# Patient Record
Sex: FEMALE | Race: WHITE | NOT HISPANIC OR LATINO | Employment: FULL TIME | URBAN - METROPOLITAN AREA
[De-identification: names, ages, dates, MRNs, and addresses within clinical notes are randomized per-mention and may not be internally consistent; named-entity substitution may affect disease eponyms.]

---

## 2017-09-06 ENCOUNTER — OFFICE VISIT (OUTPATIENT)
Dept: ORTHOPEDIC SURGERY | Facility: CLINIC | Age: 57
End: 2017-09-06

## 2017-09-06 VITALS
DIASTOLIC BLOOD PRESSURE: 91 MMHG | HEART RATE: 108 BPM | BODY MASS INDEX: 43.98 KG/M2 | HEIGHT: 62 IN | WEIGHT: 239 LBS | SYSTOLIC BLOOD PRESSURE: 134 MMHG

## 2017-09-06 DIAGNOSIS — Z47.89 ORTHOPEDIC AFTERCARE: Primary | ICD-10-CM

## 2017-09-06 PROCEDURE — 99024 POSTOP FOLLOW-UP VISIT: CPT | Performed by: ORTHOPAEDIC SURGERY

## 2017-09-06 RX ORDER — CITALOPRAM 20 MG/1
TABLET ORAL
COMMUNITY
Start: 2017-06-07 | End: 2019-08-09 | Stop reason: SDUPTHER

## 2017-09-06 RX ORDER — ESTRADIOL 0.04 MG/D
PATCH TRANSDERMAL
COMMUNITY
Start: 2017-08-28 | End: 2018-10-03 | Stop reason: SDUPTHER

## 2017-10-09 ENCOUNTER — TELEPHONE (OUTPATIENT)
Dept: ORTHOPEDIC SURGERY | Facility: CLINIC | Age: 57
End: 2017-10-09

## 2017-10-11 ENCOUNTER — OFFICE VISIT (OUTPATIENT)
Dept: ORTHOPEDIC SURGERY | Facility: CLINIC | Age: 57
End: 2017-10-11

## 2017-10-11 VITALS — HEIGHT: 62 IN

## 2017-10-11 DIAGNOSIS — Z47.89 ORTHOPEDIC AFTERCARE: ICD-10-CM

## 2017-10-11 DIAGNOSIS — M17.12 PRIMARY OSTEOARTHRITIS OF LEFT KNEE: Primary | ICD-10-CM

## 2017-10-11 PROCEDURE — 20610 DRAIN/INJ JOINT/BURSA W/O US: CPT | Performed by: ORTHOPAEDIC SURGERY

## 2017-10-11 PROCEDURE — 99024 POSTOP FOLLOW-UP VISIT: CPT | Performed by: ORTHOPAEDIC SURGERY

## 2017-10-11 RX ORDER — HYDROXYZINE HYDROCHLORIDE 25 MG/1
TABLET, FILM COATED ORAL
COMMUNITY
Start: 2017-09-21 | End: 2019-01-30

## 2017-10-11 RX ORDER — METHOCARBAMOL 500 MG/1
TABLET, FILM COATED ORAL
COMMUNITY
Start: 2017-09-21 | End: 2019-01-30

## 2017-10-11 RX ORDER — TRIAMCINOLONE ACETONIDE 40 MG/ML
40 INJECTION, SUSPENSION INTRA-ARTICULAR; INTRAMUSCULAR
Status: COMPLETED | OUTPATIENT
Start: 2017-10-11 | End: 2017-10-11

## 2017-10-11 RX ORDER — LIDOCAINE HYDROCHLORIDE 10 MG/ML
4 INJECTION, SOLUTION INFILTRATION; PERINEURAL
Status: COMPLETED | OUTPATIENT
Start: 2017-10-11 | End: 2017-10-11

## 2017-10-11 RX ADMIN — LIDOCAINE HYDROCHLORIDE 4 ML: 10 INJECTION, SOLUTION INFILTRATION; PERINEURAL at 11:53

## 2017-10-11 RX ADMIN — TRIAMCINOLONE ACETONIDE 40 MG: 40 INJECTION, SUSPENSION INTRA-ARTICULAR; INTRAMUSCULAR at 11:53

## 2017-11-08 ENCOUNTER — OFFICE VISIT (OUTPATIENT)
Dept: ORTHOPEDIC SURGERY | Facility: CLINIC | Age: 57
End: 2017-11-08

## 2017-11-08 DIAGNOSIS — M17.12 PRIMARY OSTEOARTHRITIS OF LEFT KNEE: ICD-10-CM

## 2017-11-08 DIAGNOSIS — Z47.89 ORTHOPEDIC AFTERCARE: Primary | ICD-10-CM

## 2017-11-08 PROCEDURE — 99024 POSTOP FOLLOW-UP VISIT: CPT | Performed by: ORTHOPAEDIC SURGERY

## 2017-11-08 RX ORDER — PREDNISONE 20 MG/1
TABLET ORAL
COMMUNITY
Start: 2017-11-07 | End: 2019-01-30

## 2017-11-08 RX ORDER — MELOXICAM 15 MG/1
TABLET ORAL
COMMUNITY
Start: 2017-11-07 | End: 2019-01-30

## 2018-01-17 ENCOUNTER — OFFICE VISIT (OUTPATIENT)
Dept: ORTHOPEDIC SURGERY | Facility: CLINIC | Age: 58
End: 2018-01-17

## 2018-01-17 VITALS
DIASTOLIC BLOOD PRESSURE: 95 MMHG | HEART RATE: 96 BPM | WEIGHT: 235 LBS | BODY MASS INDEX: 43.24 KG/M2 | HEIGHT: 62 IN | SYSTOLIC BLOOD PRESSURE: 139 MMHG

## 2018-01-17 DIAGNOSIS — M17.12 PRIMARY OSTEOARTHRITIS OF LEFT KNEE: Primary | ICD-10-CM

## 2018-01-17 PROCEDURE — 99212 OFFICE O/P EST SF 10 MIN: CPT | Performed by: ORTHOPAEDIC SURGERY

## 2018-02-07 ENCOUNTER — LAB REQUISITION (OUTPATIENT)
Dept: LAB | Facility: HOSPITAL | Age: 58
End: 2018-02-07

## 2018-02-07 DIAGNOSIS — Z00.00 ROUTINE GENERAL MEDICAL EXAMINATION AT A HEALTH CARE FACILITY: ICD-10-CM

## 2018-02-07 PROCEDURE — 36415 COLL VENOUS BLD VENIPUNCTURE: CPT

## 2018-05-18 ENCOUNTER — LAB REQUISITION (OUTPATIENT)
Dept: LAB | Facility: HOSPITAL | Age: 58
End: 2018-05-18

## 2018-05-18 DIAGNOSIS — Z00.00 ROUTINE GENERAL MEDICAL EXAMINATION AT A HEALTH CARE FACILITY: ICD-10-CM

## 2018-05-18 PROCEDURE — 36415 COLL VENOUS BLD VENIPUNCTURE: CPT

## 2018-05-30 ENCOUNTER — OFFICE VISIT (OUTPATIENT)
Dept: ORTHOPEDIC SURGERY | Facility: CLINIC | Age: 58
End: 2018-05-30

## 2018-05-30 VITALS — BODY MASS INDEX: 38.63 KG/M2 | HEART RATE: 95 BPM | WEIGHT: 204.59 LBS | HEIGHT: 61 IN | OXYGEN SATURATION: 99 %

## 2018-05-30 DIAGNOSIS — M17.12 PRIMARY OSTEOARTHRITIS OF LEFT KNEE: Primary | ICD-10-CM

## 2018-05-30 PROCEDURE — 99214 OFFICE O/P EST MOD 30 MIN: CPT | Performed by: ORTHOPAEDIC SURGERY

## 2018-05-30 PROCEDURE — 20610 DRAIN/INJ JOINT/BURSA W/O US: CPT | Performed by: ORTHOPAEDIC SURGERY

## 2018-05-30 RX ORDER — TRIAMCINOLONE ACETONIDE 40 MG/ML
40 INJECTION, SUSPENSION INTRA-ARTICULAR; INTRAMUSCULAR
Status: COMPLETED | OUTPATIENT
Start: 2018-05-30 | End: 2018-05-30

## 2018-05-30 RX ORDER — ROPIVACAINE HYDROCHLORIDE 5 MG/ML
4 INJECTION, SOLUTION EPIDURAL; INFILTRATION; PERINEURAL
Status: COMPLETED | OUTPATIENT
Start: 2018-05-30 | End: 2018-05-30

## 2018-05-30 RX ADMIN — TRIAMCINOLONE ACETONIDE 40 MG: 40 INJECTION, SUSPENSION INTRA-ARTICULAR; INTRAMUSCULAR at 12:29

## 2018-05-30 RX ADMIN — ROPIVACAINE HYDROCHLORIDE 4 ML: 5 INJECTION, SOLUTION EPIDURAL; INFILTRATION; PERINEURAL at 12:29

## 2018-10-03 ENCOUNTER — OFFICE VISIT (OUTPATIENT)
Dept: ORTHOPEDIC SURGERY | Facility: CLINIC | Age: 58
End: 2018-10-03

## 2018-10-03 VITALS — HEIGHT: 61 IN | OXYGEN SATURATION: 98 % | BODY MASS INDEX: 40.48 KG/M2 | HEART RATE: 91 BPM | WEIGHT: 214.4 LBS

## 2018-10-03 DIAGNOSIS — M17.12 PRIMARY OSTEOARTHRITIS OF LEFT KNEE: Primary | ICD-10-CM

## 2018-10-03 PROCEDURE — 20610 DRAIN/INJ JOINT/BURSA W/O US: CPT | Performed by: ORTHOPAEDIC SURGERY

## 2018-10-03 RX ORDER — TRIAMCINOLONE ACETONIDE 40 MG/ML
40 INJECTION, SUSPENSION INTRA-ARTICULAR; INTRAMUSCULAR
Status: COMPLETED | OUTPATIENT
Start: 2018-10-03 | End: 2018-10-03

## 2018-10-03 RX ORDER — CELECOXIB 200 MG/1
CAPSULE ORAL
COMMUNITY
Start: 2018-09-12 | End: 2019-07-28

## 2018-10-03 RX ORDER — ESTRADIOL 0.05 MG/D
PATCH TRANSDERMAL
COMMUNITY
Start: 2018-09-12 | End: 2019-08-09 | Stop reason: DRUGHIGH

## 2018-10-03 RX ORDER — ROPIVACAINE HYDROCHLORIDE 5 MG/ML
4 INJECTION, SOLUTION EPIDURAL; INFILTRATION; PERINEURAL
Status: COMPLETED | OUTPATIENT
Start: 2018-10-03 | End: 2018-10-03

## 2018-10-03 RX ADMIN — TRIAMCINOLONE ACETONIDE 40 MG: 40 INJECTION, SUSPENSION INTRA-ARTICULAR; INTRAMUSCULAR at 13:39

## 2018-10-03 RX ADMIN — ROPIVACAINE HYDROCHLORIDE 4 ML: 5 INJECTION, SOLUTION EPIDURAL; INFILTRATION; PERINEURAL at 13:39

## 2018-12-21 ENCOUNTER — LAB REQUISITION (OUTPATIENT)
Dept: LAB | Facility: HOSPITAL | Age: 58
End: 2018-12-21

## 2018-12-21 DIAGNOSIS — Z00.00 ROUTINE GENERAL MEDICAL EXAMINATION AT A HEALTH CARE FACILITY: ICD-10-CM

## 2018-12-21 PROCEDURE — 36415 COLL VENOUS BLD VENIPUNCTURE: CPT

## 2019-01-30 ENCOUNTER — OFFICE VISIT (OUTPATIENT)
Dept: ORTHOPEDIC SURGERY | Facility: CLINIC | Age: 59
End: 2019-01-30

## 2019-01-30 VITALS — OXYGEN SATURATION: 99 % | WEIGHT: 223.11 LBS | HEIGHT: 61 IN | HEART RATE: 79 BPM | BODY MASS INDEX: 42.12 KG/M2

## 2019-01-30 DIAGNOSIS — M17.12 PRIMARY OSTEOARTHRITIS OF LEFT KNEE: Primary | ICD-10-CM

## 2019-01-30 PROCEDURE — 20610 DRAIN/INJ JOINT/BURSA W/O US: CPT | Performed by: ORTHOPAEDIC SURGERY

## 2019-01-30 RX ORDER — TRIAMCINOLONE ACETONIDE 40 MG/ML
40 INJECTION, SUSPENSION INTRA-ARTICULAR; INTRAMUSCULAR
Status: COMPLETED | OUTPATIENT
Start: 2019-01-30 | End: 2019-01-30

## 2019-01-30 RX ORDER — ROPIVACAINE HYDROCHLORIDE 5 MG/ML
4 INJECTION, SOLUTION EPIDURAL; INFILTRATION; PERINEURAL
Status: COMPLETED | OUTPATIENT
Start: 2019-01-30 | End: 2019-01-30

## 2019-01-30 RX ADMIN — TRIAMCINOLONE ACETONIDE 40 MG: 40 INJECTION, SUSPENSION INTRA-ARTICULAR; INTRAMUSCULAR at 14:29

## 2019-01-30 RX ADMIN — ROPIVACAINE HYDROCHLORIDE 4 ML: 5 INJECTION, SOLUTION EPIDURAL; INFILTRATION; PERINEURAL at 14:29

## 2019-05-29 ENCOUNTER — OFFICE VISIT (OUTPATIENT)
Dept: ORTHOPEDIC SURGERY | Facility: CLINIC | Age: 59
End: 2019-05-29

## 2019-05-29 VITALS — BODY MASS INDEX: 44.16 KG/M2 | HEART RATE: 95 BPM | OXYGEN SATURATION: 98 % | WEIGHT: 233.91 LBS | HEIGHT: 61 IN

## 2019-05-29 DIAGNOSIS — M17.12 PRIMARY OSTEOARTHRITIS OF LEFT KNEE: Primary | ICD-10-CM

## 2019-05-29 PROCEDURE — 20610 DRAIN/INJ JOINT/BURSA W/O US: CPT | Performed by: ORTHOPAEDIC SURGERY

## 2019-05-29 RX ORDER — ATORVASTATIN CALCIUM 40 MG/1
TABLET, FILM COATED ORAL
Refills: 1 | COMMUNITY
Start: 2019-05-02 | End: 2019-08-09 | Stop reason: SDUPTHER

## 2019-05-29 RX ORDER — TRIAMCINOLONE ACETONIDE 40 MG/ML
40 INJECTION, SUSPENSION INTRA-ARTICULAR; INTRAMUSCULAR
Status: COMPLETED | OUTPATIENT
Start: 2019-05-29 | End: 2019-05-29

## 2019-05-29 RX ORDER — IBUPROFEN 400 MG/1
400 TABLET ORAL EVERY 6 HOURS PRN
COMMUNITY
End: 2019-08-09

## 2019-05-29 RX ORDER — ROPIVACAINE HYDROCHLORIDE 5 MG/ML
4 INJECTION, SOLUTION EPIDURAL; INFILTRATION; PERINEURAL
Status: COMPLETED | OUTPATIENT
Start: 2019-05-29 | End: 2019-05-29

## 2019-05-29 RX ADMIN — TRIAMCINOLONE ACETONIDE 40 MG: 40 INJECTION, SUSPENSION INTRA-ARTICULAR; INTRAMUSCULAR at 16:39

## 2019-05-29 RX ADMIN — ROPIVACAINE HYDROCHLORIDE 4 ML: 5 INJECTION, SOLUTION EPIDURAL; INFILTRATION; PERINEURAL at 16:39

## 2019-07-26 ENCOUNTER — LAB REQUISITION (OUTPATIENT)
Dept: LAB | Facility: HOSPITAL | Age: 59
End: 2019-07-26

## 2019-07-26 DIAGNOSIS — Z00.00 ROUTINE GENERAL MEDICAL EXAMINATION AT A HEALTH CARE FACILITY: ICD-10-CM

## 2019-07-26 PROCEDURE — 36415 COLL VENOUS BLD VENIPUNCTURE: CPT

## 2019-08-09 ENCOUNTER — OFFICE VISIT (OUTPATIENT)
Dept: FAMILY MEDICINE CLINIC | Facility: CLINIC | Age: 59
End: 2019-08-09

## 2019-08-09 VITALS
DIASTOLIC BLOOD PRESSURE: 80 MMHG | TEMPERATURE: 96.7 F | SYSTOLIC BLOOD PRESSURE: 124 MMHG | HEIGHT: 62 IN | WEIGHT: 228.4 LBS | HEART RATE: 89 BPM | OXYGEN SATURATION: 97 % | BODY MASS INDEX: 42.03 KG/M2

## 2019-08-09 DIAGNOSIS — Z12.11 COLON CANCER SCREENING: ICD-10-CM

## 2019-08-09 DIAGNOSIS — Z78.0 MENOPAUSE: ICD-10-CM

## 2019-08-09 DIAGNOSIS — R94.31 ABNORMAL EKG: ICD-10-CM

## 2019-08-09 DIAGNOSIS — Z12.39 SCREENING BREAST EXAMINATION: ICD-10-CM

## 2019-08-09 DIAGNOSIS — E78.5 DYSLIPIDEMIA: ICD-10-CM

## 2019-08-09 DIAGNOSIS — F51.01 PRIMARY INSOMNIA: ICD-10-CM

## 2019-08-09 DIAGNOSIS — Z00.00 HEALTHCARE MAINTENANCE: ICD-10-CM

## 2019-08-09 DIAGNOSIS — Z13.220 LIPID SCREENING: ICD-10-CM

## 2019-08-09 DIAGNOSIS — M17.12 ARTHRITIS OF LEFT KNEE: ICD-10-CM

## 2019-08-09 DIAGNOSIS — F32.A DEPRESSION, UNSPECIFIED DEPRESSION TYPE: Primary | ICD-10-CM

## 2019-08-09 DIAGNOSIS — R53.83 FATIGUE, UNSPECIFIED TYPE: ICD-10-CM

## 2019-08-09 DIAGNOSIS — Z11.59 NEED FOR HEPATITIS C SCREENING TEST: ICD-10-CM

## 2019-08-09 PROCEDURE — 93000 ELECTROCARDIOGRAM COMPLETE: CPT | Performed by: NURSE PRACTITIONER

## 2019-08-09 PROCEDURE — 36415 COLL VENOUS BLD VENIPUNCTURE: CPT | Performed by: NURSE PRACTITIONER

## 2019-08-09 PROCEDURE — 99204 OFFICE O/P NEW MOD 45 MIN: CPT | Performed by: NURSE PRACTITIONER

## 2019-08-09 RX ORDER — MELOXICAM 15 MG/1
15 TABLET ORAL DAILY
Qty: 30 TABLET | Refills: 11 | Status: SHIPPED | OUTPATIENT
Start: 2019-08-09 | End: 2020-05-01 | Stop reason: ALTCHOICE

## 2019-08-09 RX ORDER — ESTRADIOL 0.03 MG/D
1 FILM, EXTENDED RELEASE TRANSDERMAL 2 TIMES WEEKLY
Qty: 5 PATCH | Refills: 5 | Status: SHIPPED | OUTPATIENT
Start: 2019-08-12 | End: 2019-08-29 | Stop reason: ALTCHOICE

## 2019-08-09 RX ORDER — ATORVASTATIN CALCIUM 40 MG/1
40 TABLET, FILM COATED ORAL NIGHTLY
Qty: 30 TABLET | Refills: 5 | Status: SHIPPED | OUTPATIENT
Start: 2019-08-09

## 2019-08-09 RX ORDER — TRAZODONE HYDROCHLORIDE 50 MG/1
50 TABLET ORAL NIGHTLY
Qty: 60 TABLET | Refills: 2 | Status: SHIPPED | OUTPATIENT
Start: 2019-08-09 | End: 2020-01-07

## 2019-08-09 RX ORDER — CITALOPRAM 20 MG/1
20 TABLET ORAL DAILY
Qty: 30 TABLET | Refills: 5 | Status: SHIPPED | OUTPATIENT
Start: 2019-08-09 | End: 2020-03-30

## 2019-08-13 ENCOUNTER — LAB (OUTPATIENT)
Dept: FAMILY MEDICINE CLINIC | Facility: CLINIC | Age: 59
End: 2019-08-13

## 2019-08-13 DIAGNOSIS — Z13.220 LIPID SCREENING: ICD-10-CM

## 2019-08-13 DIAGNOSIS — Z00.00 HEALTHCARE MAINTENANCE: ICD-10-CM

## 2019-08-13 DIAGNOSIS — R53.83 FATIGUE, UNSPECIFIED TYPE: ICD-10-CM

## 2019-08-13 LAB
ALBUMIN SERPL-MCNC: 4.6 G/DL (ref 3.5–5.2)
ALBUMIN/GLOB SERPL: 1.8 G/DL
ALP SERPL-CCNC: 106 U/L (ref 39–117)
ALT SERPL W P-5'-P-CCNC: 28 U/L (ref 1–33)
ANION GAP SERPL CALCULATED.3IONS-SCNC: 13.1 MMOL/L (ref 5–15)
AST SERPL-CCNC: 28 U/L (ref 1–32)
BASOPHILS # BLD AUTO: 0.02 10*3/MM3 (ref 0–0.2)
BASOPHILS NFR BLD AUTO: 0.4 % (ref 0–1.5)
BILIRUB SERPL-MCNC: 0.9 MG/DL (ref 0.2–1.2)
BUN BLD-MCNC: 17 MG/DL (ref 6–20)
BUN/CREAT SERPL: 28.8 (ref 7–25)
CALCIUM SPEC-SCNC: 9.9 MG/DL (ref 8.6–10.5)
CHLORIDE SERPL-SCNC: 100 MMOL/L (ref 98–107)
CHOLEST SERPL-MCNC: 157 MG/DL (ref 0–200)
CO2 SERPL-SCNC: 26.9 MMOL/L (ref 22–29)
CREAT BLD-MCNC: 0.59 MG/DL (ref 0.57–1)
DEPRECATED RDW RBC AUTO: 44.7 FL (ref 37–54)
EOSINOPHIL # BLD AUTO: 0.15 10*3/MM3 (ref 0–0.4)
EOSINOPHIL NFR BLD AUTO: 2.9 % (ref 0.3–6.2)
ERYTHROCYTE [DISTWIDTH] IN BLOOD BY AUTOMATED COUNT: 13 % (ref 12.3–15.4)
GFR SERPL CREATININE-BSD FRML MDRD: 105 ML/MIN/1.73
GLOBULIN UR ELPH-MCNC: 2.5 GM/DL
GLUCOSE BLD-MCNC: 112 MG/DL (ref 65–99)
HBA1C MFR BLD: 5.3 % (ref 4.8–5.6)
HCT VFR BLD AUTO: 43.1 % (ref 34–46.6)
HDLC SERPL QL: 2.8
HDLC SERPL-MCNC: 56 MG/DL (ref 40–60)
HGB BLD-MCNC: 14.2 G/DL (ref 12–15.9)
IMM GRANULOCYTES # BLD AUTO: 0.01 10*3/MM3 (ref 0–0.05)
IMM GRANULOCYTES NFR BLD AUTO: 0.2 % (ref 0–0.5)
LDLC SERPL CALC-MCNC: 85 MG/DL (ref 0–100)
LYMPHOCYTES # BLD AUTO: 1.48 10*3/MM3 (ref 0.7–3.1)
LYMPHOCYTES NFR BLD AUTO: 28.4 % (ref 19.6–45.3)
MCH RBC QN AUTO: 31.3 PG (ref 26.6–33)
MCHC RBC AUTO-ENTMCNC: 32.9 G/DL (ref 31.5–35.7)
MCV RBC AUTO: 95.1 FL (ref 79–97)
MONOCYTES # BLD AUTO: 0.43 10*3/MM3 (ref 0.1–0.9)
MONOCYTES NFR BLD AUTO: 8.2 % (ref 5–12)
NEUTROPHILS # BLD AUTO: 3.13 10*3/MM3 (ref 1.7–7)
NEUTROPHILS NFR BLD AUTO: 59.9 % (ref 42.7–76)
NRBC BLD AUTO-RTO: 0 /100 WBC (ref 0–0.2)
PLATELET # BLD AUTO: 228 10*3/MM3 (ref 140–450)
PMV BLD AUTO: 11.1 FL (ref 6–12)
POTASSIUM BLD-SCNC: 4.4 MMOL/L (ref 3.5–5.2)
PROT SERPL-MCNC: 7.1 G/DL (ref 6–8.5)
RBC # BLD AUTO: 4.53 10*6/MM3 (ref 3.77–5.28)
SODIUM BLD-SCNC: 140 MMOL/L (ref 136–145)
T4 FREE SERPL-MCNC: 1.2 NG/DL (ref 0.93–1.7)
TRIGL SERPL-MCNC: 79 MG/DL (ref 0–150)
TSH SERPL DL<=0.05 MIU/L-ACNC: 2.37 MIU/ML (ref 0.27–4.2)
VIT B12 BLD-MCNC: 675 PG/ML (ref 211–946)
VLDLC SERPL-MCNC: 15.8 MG/DL (ref 5–40)
WBC NRBC COR # BLD: 5.22 10*3/MM3 (ref 3.4–10.8)

## 2019-08-13 PROCEDURE — 84439 ASSAY OF FREE THYROXINE: CPT | Performed by: NURSE PRACTITIONER

## 2019-08-13 PROCEDURE — 80053 COMPREHEN METABOLIC PANEL: CPT | Performed by: NURSE PRACTITIONER

## 2019-08-13 PROCEDURE — 84443 ASSAY THYROID STIM HORMONE: CPT | Performed by: NURSE PRACTITIONER

## 2019-08-13 PROCEDURE — 80061 LIPID PANEL: CPT | Performed by: NURSE PRACTITIONER

## 2019-08-13 PROCEDURE — 83036 HEMOGLOBIN GLYCOSYLATED A1C: CPT | Performed by: NURSE PRACTITIONER

## 2019-08-13 PROCEDURE — 82607 VITAMIN B-12: CPT | Performed by: NURSE PRACTITIONER

## 2019-08-13 PROCEDURE — 82652 VIT D 1 25-DIHYDROXY: CPT | Performed by: NURSE PRACTITIONER

## 2019-08-13 PROCEDURE — 85025 COMPLETE CBC W/AUTO DIFF WBC: CPT | Performed by: NURSE PRACTITIONER

## 2019-08-13 PROCEDURE — 36415 COLL VENOUS BLD VENIPUNCTURE: CPT | Performed by: NURSE PRACTITIONER

## 2019-08-15 LAB — 1,25(OH)2D3 SERPL-MCNC: 68 PG/ML (ref 19.9–79.3)

## 2019-08-20 ENCOUNTER — OFFICE VISIT (OUTPATIENT)
Dept: CARDIOLOGY | Facility: HOSPITAL | Age: 59
End: 2019-08-20

## 2019-08-20 VITALS
SYSTOLIC BLOOD PRESSURE: 123 MMHG | TEMPERATURE: 97.2 F | BODY MASS INDEX: 42.55 KG/M2 | OXYGEN SATURATION: 95 % | DIASTOLIC BLOOD PRESSURE: 81 MMHG | WEIGHT: 231.25 LBS | HEIGHT: 62 IN | HEART RATE: 71 BPM | RESPIRATION RATE: 18 BRPM

## 2019-08-20 DIAGNOSIS — R06.09 DOE (DYSPNEA ON EXERTION): Primary | ICD-10-CM

## 2019-08-20 DIAGNOSIS — R07.9 CHEST PAIN, UNSPECIFIED TYPE: ICD-10-CM

## 2019-08-20 DIAGNOSIS — R01.1 MURMUR: ICD-10-CM

## 2019-08-20 DIAGNOSIS — E78.5 DYSLIPIDEMIA: ICD-10-CM

## 2019-08-20 DIAGNOSIS — R00.2 PALPITATIONS: ICD-10-CM

## 2019-08-20 DIAGNOSIS — R94.31 ABNORMAL EKG: ICD-10-CM

## 2019-08-20 PROCEDURE — 99214 OFFICE O/P EST MOD 30 MIN: CPT | Performed by: NURSE PRACTITIONER

## 2019-08-29 ENCOUNTER — OFFICE VISIT (OUTPATIENT)
Dept: FAMILY MEDICINE CLINIC | Facility: CLINIC | Age: 59
End: 2019-08-29

## 2019-08-29 VITALS
DIASTOLIC BLOOD PRESSURE: 88 MMHG | RESPIRATION RATE: 16 BRPM | HEIGHT: 62 IN | TEMPERATURE: 96.2 F | BODY MASS INDEX: 42.25 KG/M2 | WEIGHT: 229.6 LBS | OXYGEN SATURATION: 94 % | SYSTOLIC BLOOD PRESSURE: 118 MMHG | HEART RATE: 81 BPM

## 2019-08-29 DIAGNOSIS — F32.A DEPRESSION, UNSPECIFIED DEPRESSION TYPE: ICD-10-CM

## 2019-08-29 DIAGNOSIS — M17.12 ARTHRITIS OF LEFT KNEE: ICD-10-CM

## 2019-08-29 DIAGNOSIS — F51.01 PRIMARY INSOMNIA: Primary | ICD-10-CM

## 2019-08-29 DIAGNOSIS — Z78.0 MENOPAUSE: ICD-10-CM

## 2019-08-29 PROBLEM — M17.0 PRIMARY OSTEOARTHRITIS OF BOTH KNEES: Status: ACTIVE | Noted: 2019-08-09

## 2019-08-29 PROBLEM — M77.32 HEEL SPUR, LEFT: Status: ACTIVE | Noted: 2019-08-29

## 2019-08-29 PROBLEM — E66.01 CLASS 3 SEVERE OBESITY IN ADULT (HCC): Status: ACTIVE | Noted: 2019-08-29

## 2019-08-29 PROCEDURE — 99214 OFFICE O/P EST MOD 30 MIN: CPT | Performed by: NURSE PRACTITIONER

## 2019-08-29 RX ORDER — ESTRADIOL 0.5 MG/1
0.25 TABLET ORAL DAILY
Qty: 15 TABLET | Refills: 11 | Status: SHIPPED | OUTPATIENT
Start: 2019-08-29 | End: 2020-02-23

## 2019-08-29 RX ORDER — CELECOXIB 200 MG/1
200 CAPSULE ORAL DAILY
Qty: 30 CAPSULE | Refills: 0 | Status: SHIPPED | OUTPATIENT
Start: 2019-08-29 | End: 2020-05-01 | Stop reason: SDUPTHER

## 2019-09-05 ENCOUNTER — OFFICE VISIT (OUTPATIENT)
Dept: FAMILY MEDICINE CLINIC | Facility: CLINIC | Age: 59
End: 2019-09-05

## 2019-09-05 ENCOUNTER — HOSPITAL ENCOUNTER (OUTPATIENT)
Dept: CARDIOLOGY | Facility: HOSPITAL | Age: 59
Discharge: HOME | End: 2019-09-05
Admitting: NURSE PRACTITIONER

## 2019-09-05 VITALS
OXYGEN SATURATION: 96 % | TEMPERATURE: 96.7 F | SYSTOLIC BLOOD PRESSURE: 118 MMHG | HEIGHT: 62 IN | DIASTOLIC BLOOD PRESSURE: 70 MMHG | BODY MASS INDEX: 42.51 KG/M2 | WEIGHT: 231 LBS | HEART RATE: 87 BPM | RESPIRATION RATE: 18 BRPM

## 2019-09-05 DIAGNOSIS — E78.5 DYSLIPIDEMIA: ICD-10-CM

## 2019-09-05 DIAGNOSIS — M67.441 GANGLION CYST OF JOINT OF FINGER OF RIGHT HAND: Primary | ICD-10-CM

## 2019-09-05 DIAGNOSIS — R06.09 DOE (DYSPNEA ON EXERTION): ICD-10-CM

## 2019-09-05 DIAGNOSIS — R07.9 CHEST PAIN, UNSPECIFIED TYPE: ICD-10-CM

## 2019-09-05 DIAGNOSIS — R94.31 ABNORMAL EKG: ICD-10-CM

## 2019-09-05 LAB
BH CV STRESS BP STAGE 1: NORMAL
BH CV STRESS BP STAGE 3: NORMAL
BH CV STRESS COMMENTS STAGE 1: NORMAL
BH CV STRESS DOSE REGADENOSON STAGE 1: 0.4
BH CV STRESS DURATION MIN STAGE 1: 1
BH CV STRESS DURATION MIN STAGE 2: 1
BH CV STRESS DURATION MIN STAGE 3: 1
BH CV STRESS DURATION MIN STAGE 4: 1
BH CV STRESS DURATION SEC STAGE 1: 0
BH CV STRESS DURATION SEC STAGE 2: 0
BH CV STRESS DURATION SEC STAGE 3: 0
BH CV STRESS DURATION SEC STAGE 4: 0
BH CV STRESS HR STAGE 1: 93
BH CV STRESS HR STAGE 2: 99
BH CV STRESS HR STAGE 3: 98
BH CV STRESS HR STAGE 4: 93
BH CV STRESS PROTOCOL 1: NORMAL
BH CV STRESS RECOVERY BP: NORMAL MMHG
BH CV STRESS RECOVERY HR: 90 BPM
BH CV STRESS RECOVERY O2: 99 %
BH CV STRESS STAGE 1: 1
BH CV STRESS STAGE 2: 2
BH CV STRESS STAGE 3: 3
BH CV STRESS STAGE 4: 4
LV EF NUC BP: 74 %
MAXIMAL PREDICTED HEART RATE: 163 BPM
PERCENT MAX PREDICTED HR: 62.58 %
STRESS BASELINE BP: NORMAL MMHG
STRESS BASELINE HR: 74 BPM
STRESS O2 SAT REST: 99 %
STRESS PERCENT HR: 74 %
STRESS POST O2 SAT PEAK: 100 %
STRESS POST PEAK BP: NORMAL MMHG
STRESS POST PEAK HR: 102 BPM
STRESS TARGET HR: 139 BPM

## 2019-09-05 PROCEDURE — 93017 CV STRESS TEST TRACING ONLY: CPT

## 2019-09-05 PROCEDURE — 0 RUBIDIUM CHLORIDE: Performed by: NURSE PRACTITIONER

## 2019-09-05 PROCEDURE — A9555 RB82 RUBIDIUM: HCPCS | Performed by: NURSE PRACTITIONER

## 2019-09-05 PROCEDURE — 99213 OFFICE O/P EST LOW 20 MIN: CPT | Performed by: NURSE PRACTITIONER

## 2019-09-05 PROCEDURE — 78492 MYOCRD IMG PET MLT RST&STRS: CPT | Performed by: INTERNAL MEDICINE

## 2019-09-05 PROCEDURE — 93018 CV STRESS TEST I&R ONLY: CPT | Performed by: INTERNAL MEDICINE

## 2019-09-05 PROCEDURE — 25010000002 REGADENOSON 0.4 MG/5ML SOLUTION: Performed by: NURSE PRACTITIONER

## 2019-09-05 PROCEDURE — 78492 MYOCRD IMG PET MLT RST&STRS: CPT

## 2019-09-05 RX ADMIN — RUBIDIUM CHLORIDE RB-82 1 DOSE: 150 INJECTION, SOLUTION INTRAVENOUS at 10:52

## 2019-09-05 RX ADMIN — RUBIDIUM CHLORIDE RB-82 1 DOSE: 150 INJECTION, SOLUTION INTRAVENOUS at 10:41

## 2019-09-05 RX ADMIN — REGADENOSON 0.4 MG: 0.08 INJECTION, SOLUTION INTRAVENOUS at 10:51

## 2019-09-10 ENCOUNTER — APPOINTMENT (OUTPATIENT)
Dept: CARDIOLOGY | Facility: HOSPITAL | Age: 59
End: 2019-09-10

## 2019-10-02 ENCOUNTER — OFFICE VISIT (OUTPATIENT)
Dept: ORTHOPEDIC SURGERY | Facility: CLINIC | Age: 59
End: 2019-10-02

## 2019-10-02 VITALS — WEIGHT: 229.72 LBS | OXYGEN SATURATION: 94 % | BODY MASS INDEX: 42.27 KG/M2 | HEART RATE: 69 BPM | HEIGHT: 62 IN

## 2019-10-02 DIAGNOSIS — M17.12 PRIMARY OSTEOARTHRITIS OF LEFT KNEE: Primary | ICD-10-CM

## 2019-10-02 PROCEDURE — 20610 DRAIN/INJ JOINT/BURSA W/O US: CPT | Performed by: ORTHOPAEDIC SURGERY

## 2019-10-02 PROCEDURE — 99213 OFFICE O/P EST LOW 20 MIN: CPT | Performed by: ORTHOPAEDIC SURGERY

## 2019-10-02 RX ORDER — TRIAMCINOLONE ACETONIDE 40 MG/ML
40 INJECTION, SUSPENSION INTRA-ARTICULAR; INTRAMUSCULAR
Status: COMPLETED | OUTPATIENT
Start: 2019-10-02 | End: 2019-10-02

## 2019-10-02 RX ORDER — ROPIVACAINE HYDROCHLORIDE 5 MG/ML
4 INJECTION, SOLUTION EPIDURAL; INFILTRATION; PERINEURAL
Status: COMPLETED | OUTPATIENT
Start: 2019-10-02 | End: 2019-10-02

## 2019-10-02 RX ADMIN — ROPIVACAINE HYDROCHLORIDE 4 ML: 5 INJECTION, SOLUTION EPIDURAL; INFILTRATION; PERINEURAL at 16:02

## 2019-10-02 RX ADMIN — TRIAMCINOLONE ACETONIDE 40 MG: 40 INJECTION, SUSPENSION INTRA-ARTICULAR; INTRAMUSCULAR at 16:02

## 2019-10-09 ENCOUNTER — APPOINTMENT (OUTPATIENT)
Dept: MAMMOGRAPHY | Facility: HOSPITAL | Age: 59
End: 2019-10-09

## 2019-12-09 ENCOUNTER — APPOINTMENT (OUTPATIENT)
Dept: MAMMOGRAPHY | Facility: HOSPITAL | Age: 59
End: 2019-12-09

## 2020-01-06 DIAGNOSIS — F51.01 PRIMARY INSOMNIA: ICD-10-CM

## 2020-01-07 RX ORDER — TRAZODONE HYDROCHLORIDE 50 MG/1
50 TABLET ORAL NIGHTLY
Qty: 60 TABLET | Refills: 0 | Status: SHIPPED | OUTPATIENT
Start: 2020-01-07 | End: 2020-02-12

## 2020-02-05 ENCOUNTER — TELEPHONE (OUTPATIENT)
Dept: ORTHOPEDIC SURGERY | Facility: CLINIC | Age: 60
End: 2020-02-05

## 2020-02-12 DIAGNOSIS — F51.01 PRIMARY INSOMNIA: ICD-10-CM

## 2020-02-12 RX ORDER — TRAZODONE HYDROCHLORIDE 50 MG/1
TABLET ORAL
Qty: 60 TABLET | Refills: 0 | Status: SHIPPED | OUTPATIENT
Start: 2020-02-12 | End: 2020-02-14 | Stop reason: SDUPTHER

## 2020-02-14 DIAGNOSIS — F51.01 PRIMARY INSOMNIA: ICD-10-CM

## 2020-02-14 RX ORDER — TRAZODONE HYDROCHLORIDE 50 MG/1
50 TABLET ORAL NIGHTLY
Qty: 60 TABLET | Refills: 5 | Status: SHIPPED | OUTPATIENT
Start: 2020-02-14 | End: 2020-03-21

## 2020-02-23 DIAGNOSIS — Z78.0 MENOPAUSE: ICD-10-CM

## 2020-02-23 RX ORDER — ESTRADIOL 0.5 MG/1
TABLET ORAL
Qty: 15 TABLET | Refills: 0 | Status: SHIPPED | OUTPATIENT
Start: 2020-02-23 | End: 2020-10-21 | Stop reason: SDUPTHER

## 2020-03-21 DIAGNOSIS — F51.01 PRIMARY INSOMNIA: ICD-10-CM

## 2020-03-21 RX ORDER — TRAZODONE HYDROCHLORIDE 50 MG/1
TABLET ORAL
Qty: 60 TABLET | Refills: 4 | Status: SHIPPED | OUTPATIENT
Start: 2020-03-21 | End: 2020-06-11

## 2020-03-29 DIAGNOSIS — F32.A DEPRESSION, UNSPECIFIED DEPRESSION TYPE: ICD-10-CM

## 2020-03-30 RX ORDER — CITALOPRAM 20 MG/1
TABLET ORAL
Qty: 30 TABLET | Refills: 2 | Status: SHIPPED | OUTPATIENT
Start: 2020-03-30 | End: 2020-04-21

## 2020-04-20 ENCOUNTER — TELEPHONE (OUTPATIENT)
Dept: FAMILY MEDICINE CLINIC | Facility: CLINIC | Age: 60
End: 2020-04-20

## 2020-04-21 DIAGNOSIS — F32.A DEPRESSION, UNSPECIFIED DEPRESSION TYPE: Primary | ICD-10-CM

## 2020-04-21 RX ORDER — CITALOPRAM 40 MG/1
40 TABLET ORAL DAILY
Qty: 30 TABLET | Refills: 2 | Status: SHIPPED | OUTPATIENT
Start: 2020-04-21 | End: 2020-08-06

## 2020-05-01 ENCOUNTER — TELEMEDICINE (OUTPATIENT)
Dept: FAMILY MEDICINE CLINIC | Facility: CLINIC | Age: 60
End: 2020-05-01

## 2020-05-01 DIAGNOSIS — Z78.0 MENOPAUSE: Primary | ICD-10-CM

## 2020-05-01 DIAGNOSIS — M17.12 ARTHRITIS OF LEFT KNEE: ICD-10-CM

## 2020-05-01 DIAGNOSIS — Z76.0 MEDICATION REFILL: ICD-10-CM

## 2020-05-01 PROCEDURE — 99213 OFFICE O/P EST LOW 20 MIN: CPT | Performed by: NURSE PRACTITIONER

## 2020-05-01 RX ORDER — CELECOXIB 200 MG/1
200 CAPSULE ORAL DAILY
Qty: 30 CAPSULE | Refills: 0 | Status: SHIPPED | OUTPATIENT
Start: 2020-05-01

## 2020-06-11 ENCOUNTER — TELEPHONE (OUTPATIENT)
Dept: FAMILY MEDICINE CLINIC | Facility: CLINIC | Age: 60
End: 2020-06-11

## 2020-06-11 DIAGNOSIS — F51.01 PRIMARY INSOMNIA: Primary | ICD-10-CM

## 2020-06-11 RX ORDER — TRAZODONE HYDROCHLORIDE 100 MG/1
100 TABLET ORAL NIGHTLY
Qty: 30 TABLET | Refills: 5 | Status: SHIPPED | OUTPATIENT
Start: 2020-06-11

## 2020-08-04 DIAGNOSIS — F32.A DEPRESSION, UNSPECIFIED DEPRESSION TYPE: ICD-10-CM

## 2020-08-06 RX ORDER — CITALOPRAM 40 MG/1
TABLET ORAL
Qty: 30 TABLET | Refills: 0 | Status: SHIPPED | OUTPATIENT
Start: 2020-08-06 | End: 2020-09-10

## 2020-09-10 DIAGNOSIS — F32.A DEPRESSION, UNSPECIFIED DEPRESSION TYPE: ICD-10-CM

## 2020-09-10 RX ORDER — CITALOPRAM 40 MG/1
TABLET ORAL
Qty: 30 TABLET | Refills: 0 | Status: SHIPPED | OUTPATIENT
Start: 2020-09-10

## 2020-09-15 DIAGNOSIS — Z78.0 MENOPAUSE: ICD-10-CM

## 2020-09-15 RX ORDER — MELOXICAM 15 MG/1
15 TABLET ORAL DAILY
COMMUNITY
Start: 2020-08-04 | End: 2020-09-15 | Stop reason: SDUPTHER

## 2020-09-17 DIAGNOSIS — Z78.0 MENOPAUSE: ICD-10-CM

## 2020-09-17 RX ORDER — ESTRADIOL 0.5 MG/1
0.25 TABLET ORAL DAILY
Qty: 15 TABLET | Refills: 0 | OUTPATIENT
Start: 2020-09-17

## 2020-09-17 RX ORDER — MELOXICAM 15 MG/1
15 TABLET ORAL DAILY
OUTPATIENT
Start: 2020-09-17

## 2020-09-17 RX ORDER — MELOXICAM 15 MG/1
15 TABLET ORAL DAILY
Qty: 30 TABLET | Refills: 0 | Status: SHIPPED | OUTPATIENT
Start: 2020-09-17

## 2020-10-21 DIAGNOSIS — Z78.0 MENOPAUSE: ICD-10-CM

## 2020-10-21 RX ORDER — ESTRADIOL 0.5 MG/1
0.25 TABLET ORAL DAILY
Qty: 15 TABLET | Refills: 0 | Status: SHIPPED | OUTPATIENT
Start: 2020-10-21

## 2021-01-14 RX ORDER — MELOXICAM 15 MG/1
TABLET ORAL
Qty: 30 TABLET | Refills: 0 | OUTPATIENT
Start: 2021-01-14

## 2021-01-19 RX ORDER — MELOXICAM 15 MG/1
15 TABLET ORAL DAILY
Qty: 30 TABLET | Refills: 0 | OUTPATIENT
Start: 2021-01-19

## 2021-08-18 DIAGNOSIS — F32.A DEPRESSION, UNSPECIFIED DEPRESSION TYPE: ICD-10-CM

## 2021-08-18 RX ORDER — CITALOPRAM 40 MG/1
40 TABLET ORAL DAILY
Qty: 30 TABLET | Refills: 0 | OUTPATIENT
Start: 2021-08-18

## 2022-07-04 PROCEDURE — U0004 COV-19 TEST NON-CDC HGH THRU: HCPCS | Performed by: PHYSICIAN ASSISTANT

## 2023-01-31 ENCOUNTER — TRANSFERRED RECORDS (OUTPATIENT)
Dept: MULTI SPECIALTY CLINIC | Facility: CLINIC | Age: 63
End: 2023-01-31

## 2023-12-28 ENCOUNTER — OFFICE VISIT (OUTPATIENT)
Dept: FAMILY MEDICINE | Facility: CLINIC | Age: 63
End: 2023-12-28
Payer: COMMERCIAL

## 2023-12-28 VITALS
WEIGHT: 194.56 LBS | HEART RATE: 83 BPM | SYSTOLIC BLOOD PRESSURE: 117 MMHG | BODY MASS INDEX: 38.2 KG/M2 | TEMPERATURE: 98.3 F | OXYGEN SATURATION: 100 % | DIASTOLIC BLOOD PRESSURE: 82 MMHG | HEIGHT: 60 IN | RESPIRATION RATE: 16 BRPM

## 2023-12-28 DIAGNOSIS — F33.1 MODERATE EPISODE OF RECURRENT MAJOR DEPRESSIVE DISORDER (H): Primary | ICD-10-CM

## 2023-12-28 DIAGNOSIS — Z98.84 H/O GASTRIC BYPASS: ICD-10-CM

## 2023-12-28 DIAGNOSIS — Z23 HIGH PRIORITY FOR 2019-NCOV VACCINE: ICD-10-CM

## 2023-12-28 DIAGNOSIS — F41.9 ANXIETY: ICD-10-CM

## 2023-12-28 LAB
BASOPHILS # BLD AUTO: 0.1 10E3/UL (ref 0–0.2)
BASOPHILS NFR BLD AUTO: 1 %
EOSINOPHIL # BLD AUTO: 0.3 10E3/UL (ref 0–0.7)
EOSINOPHIL NFR BLD AUTO: 4 %
ERYTHROCYTE [DISTWIDTH] IN BLOOD BY AUTOMATED COUNT: 13.2 % (ref 10–15)
HCT VFR BLD AUTO: 42.7 % (ref 35–47)
HGB BLD-MCNC: 14 G/DL (ref 11.7–15.7)
IMM GRANULOCYTES # BLD: 0 10E3/UL
IMM GRANULOCYTES NFR BLD: 0 %
LYMPHOCYTES # BLD AUTO: 2.1 10E3/UL (ref 0.8–5.3)
LYMPHOCYTES NFR BLD AUTO: 33 %
MCH RBC QN AUTO: 30 PG (ref 26.5–33)
MCHC RBC AUTO-ENTMCNC: 32.8 G/DL (ref 31.5–36.5)
MCV RBC AUTO: 92 FL (ref 78–100)
MONOCYTES # BLD AUTO: 0.6 10E3/UL (ref 0–1.3)
MONOCYTES NFR BLD AUTO: 9 %
NEUTROPHILS # BLD AUTO: 3.4 10E3/UL (ref 1.6–8.3)
NEUTROPHILS NFR BLD AUTO: 53 %
NRBC # BLD AUTO: 0 10E3/UL
NRBC BLD AUTO-RTO: 0 /100
PLATELET # BLD AUTO: 282 10E3/UL (ref 150–450)
RBC # BLD AUTO: 4.66 10E6/UL (ref 3.8–5.2)
TSH SERPL DL<=0.005 MIU/L-ACNC: 2.28 UIU/ML (ref 0.3–4.2)
VIT B12 SERPL-MCNC: 751 PG/ML (ref 232–1245)
VIT D+METAB SERPL-MCNC: 32 NG/ML (ref 20–50)
WBC # BLD AUTO: 6.4 10E3/UL (ref 4–11)

## 2023-12-28 PROCEDURE — 84443 ASSAY THYROID STIM HORMONE: CPT | Performed by: FAMILY MEDICINE

## 2023-12-28 PROCEDURE — 90480 ADMN SARSCOV2 VAC 1/ONLY CMP: CPT | Performed by: FAMILY MEDICINE

## 2023-12-28 PROCEDURE — 91320 SARSCV2 VAC 30MCG TRS-SUC IM: CPT | Performed by: FAMILY MEDICINE

## 2023-12-28 PROCEDURE — 82306 VITAMIN D 25 HYDROXY: CPT | Performed by: FAMILY MEDICINE

## 2023-12-28 PROCEDURE — 85025 COMPLETE CBC W/AUTO DIFF WBC: CPT | Performed by: FAMILY MEDICINE

## 2023-12-28 PROCEDURE — 99204 OFFICE O/P NEW MOD 45 MIN: CPT | Mod: 25 | Performed by: FAMILY MEDICINE

## 2023-12-28 PROCEDURE — 96127 BRIEF EMOTIONAL/BEHAV ASSMT: CPT | Performed by: FAMILY MEDICINE

## 2023-12-28 PROCEDURE — 82607 VITAMIN B-12: CPT | Performed by: FAMILY MEDICINE

## 2023-12-28 PROCEDURE — 36415 COLL VENOUS BLD VENIPUNCTURE: CPT | Performed by: FAMILY MEDICINE

## 2023-12-28 RX ORDER — VENLAFAXINE HYDROCHLORIDE 75 MG/1
75 CAPSULE, EXTENDED RELEASE ORAL DAILY
COMMUNITY
Start: 2023-12-21 | End: 2023-12-28 | Stop reason: DRUGHIGH

## 2023-12-28 RX ORDER — CYCLOBENZAPRINE HCL 5 MG
5 TABLET ORAL AT BEDTIME
COMMUNITY
Start: 2023-12-23 | End: 2024-02-13

## 2023-12-28 RX ORDER — TRAZODONE HYDROCHLORIDE 100 MG/1
100 TABLET ORAL AT BEDTIME
COMMUNITY
Start: 2023-12-21 | End: 2024-02-13

## 2023-12-28 RX ORDER — MELOXICAM 15 MG/1
15 TABLET ORAL
COMMUNITY
Start: 2023-12-21 | End: 2024-02-13

## 2023-12-28 RX ORDER — VENLAFAXINE HYDROCHLORIDE 150 MG/1
150 CAPSULE, EXTENDED RELEASE ORAL DAILY
Qty: 30 CAPSULE | Refills: 1 | Status: SHIPPED | OUTPATIENT
Start: 2023-12-28 | End: 2024-02-13

## 2023-12-28 ASSESSMENT — PAIN SCALES - GENERAL: PAINLEVEL: NO PAIN (0)

## 2023-12-28 ASSESSMENT — ANXIETY QUESTIONNAIRES
2. NOT BEING ABLE TO STOP OR CONTROL WORRYING: NEARLY EVERY DAY
IF YOU CHECKED OFF ANY PROBLEMS ON THIS QUESTIONNAIRE, HOW DIFFICULT HAVE THESE PROBLEMS MADE IT FOR YOU TO DO YOUR WORK, TAKE CARE OF THINGS AT HOME, OR GET ALONG WITH OTHER PEOPLE: SOMEWHAT DIFFICULT
6. BECOMING EASILY ANNOYED OR IRRITABLE: MORE THAN HALF THE DAYS
1. FEELING NERVOUS, ANXIOUS, OR ON EDGE: NEARLY EVERY DAY
5. BEING SO RESTLESS THAT IT IS HARD TO SIT STILL: MORE THAN HALF THE DAYS
3. WORRYING TOO MUCH ABOUT DIFFERENT THINGS: NEARLY EVERY DAY
4. TROUBLE RELAXING: NEARLY EVERY DAY
GAD7 TOTAL SCORE: 18
GAD7 TOTAL SCORE: 18
7. FEELING AFRAID AS IF SOMETHING AWFUL MIGHT HAPPEN: MORE THAN HALF THE DAYS

## 2023-12-28 ASSESSMENT — PATIENT HEALTH QUESTIONNAIRE - PHQ9
SUM OF ALL RESPONSES TO PHQ QUESTIONS 1-9: 14
10. IF YOU CHECKED OFF ANY PROBLEMS, HOW DIFFICULT HAVE THESE PROBLEMS MADE IT FOR YOU TO DO YOUR WORK, TAKE CARE OF THINGS AT HOME, OR GET ALONG WITH OTHER PEOPLE: SOMEWHAT DIFFICULT
SUM OF ALL RESPONSES TO PHQ QUESTIONS 1-9: 14

## 2023-12-28 NOTE — NURSING NOTE
Prior to immunization administration, verified patients identity using patient s name and date of birth. Please see Immunization Activity for additional information.     Screening Questionnaire for Adult Immunization    Are you sick today?   No   Do you have allergies to medications, food, a vaccine component or latex?   No   Have you ever had a serious reaction after receiving a vaccination?   No   Do you have a long-term health problem with heart, lung, kidney, or metabolic disease (e.g., diabetes), asthma, a blood disorder, no spleen, complement component deficiency, a cochlear implant, or a spinal fluid leak?  Are you on long-term aspirin therapy?   No   Do you have cancer, leukemia, HIV/AIDS, or any other immune system problem?   No   Do you have a parent, brother, or sister with an immune system problem?   No   In the past 3 months, have you taken medications that affect  your immune system, such as prednisone, other steroids, or anticancer drugs; drugs for the treatment of rheumatoid arthritis, Crohn s disease, or psoriasis; or have you had radiation treatments?   No   Have you had a seizure, or a brain or other nervous system problem?   No   During the past year, have you received a transfusion of blood or blood    products, or been given immune (gamma) globulin or antiviral drug?   No   For women: Are you pregnant or is there a chance you could become       pregnant during the next month?   No   Have you received any vaccinations in the past 4 weeks?   No     Immunization questionnaire answers were all negative.      Patient instructed to remain in clinic for 15 minutes afterwards, and to report any adverse reactions.     Screening performed by Joelle Mattson MA on 12/28/2023 at 2:28 PM.

## 2023-12-28 NOTE — PATIENT INSTRUCTIONS
Next visit in 6-8 weeks  Ok to message me in 3 weeks if wanting to further increase the venlafaxine

## 2023-12-28 NOTE — LETTER
December 29, 2023      Farrah Garcia  [address] [facility] [address]        Dear ,    We are writing to inform you of your test results.    It was a pleasure seeing you at your recent visit. Your labs have been reviewed and are attached.     Vitamin D level is low range of normal.  Please continue with the plan to start taking your multivitamin on a more regular basis to get this level up.     The vitamin B12 level was normal.  Your blood count panel was good with no anemia.     Thyroid test was also normal.       Resulted Orders   Vitamin B12   Result Value Ref Range    Vitamin B12 751 232 - 1,245 pg/mL   Vitamin D Deficiency   Result Value Ref Range    Vitamin D, Total (25-Hydroxy) 32 20 - 50 ng/mL      Comment:      optimum levels    Narrative    Season, race, dietary intake, and treatment affect the concentration of 25-hydroxy-Vitamin D. Values may decrease during winter months and increase during summer months.    Vitamin D determination is routinely performed by an immunoassay specific for 25 hydroxyvitamin D3.  If an individual is on vitamin D2(ergocalciferol) supplementation, please specify 25 OH vitamin D2 and D3 level determination by LCMSMS test VITD23.     TSH with free T4 reflex   Result Value Ref Range    TSH 2.28 0.30 - 4.20 uIU/mL   CBC with platelets and differential   Result Value Ref Range    WBC Count 6.4 4.0 - 11.0 10e3/uL    RBC Count 4.66 3.80 - 5.20 10e6/uL    Hemoglobin 14.0 11.7 - 15.7 g/dL    Hematocrit 42.7 35.0 - 47.0 %    MCV 92 78 - 100 fL    MCH 30.0 26.5 - 33.0 pg    MCHC 32.8 31.5 - 36.5 g/dL    RDW 13.2 10.0 - 15.0 %    Platelet Count 282 150 - 450 10e3/uL    % Neutrophils 53 %    % Lymphocytes 33 %    % Monocytes 9 %    % Eosinophils 4 %    % Basophils 1 %    % Immature Granulocytes 0 %    NRBCs per 100 WBC 0 <1 /100    Absolute Neutrophils 3.4 1.6 - 8.3 10e3/uL    Absolute Lymphocytes 2.1 0.8 - 5.3 10e3/uL    Absolute Monocytes 0.6 0.0 - 1.3 10e3/uL     Absolute Eosinophils 0.3 0.0 - 0.7 10e3/uL    Absolute Basophils 0.1 0.0 - 0.2 10e3/uL    Absolute Immature Granulocytes 0.0 <=0.4 10e3/uL    Absolute NRBCs 0.0 10e3/uL       If you have any questions or concerns, please call the clinic at the number listed above.       Sincerely,      Michelle Mondragon MD

## 2023-12-28 NOTE — PROGRESS NOTES
"  Assessment & Plan     Moderate episode of recurrent major depressive disorder (H)  Anxiety  Recent flare of symptoms and multiple situational stressors with recent move, new job, etc.  Agree with dose increase of venlafaxine from 75-1 50 but encouraged her to take this all-in-one dose for simplicity.  We discussed it would likely take several more weeks to see benefit of dose change and we could continue to titrate up to 225 mg if needed.  I do think the higher dosing will give her better anxiety management that she has had on the 75 mg.  We did discuss the option of adding on Wellbutrin to help with apathy, focus, etc. but she wishes to start with just the venlafaxine change.  Reviewed onset of action of meds, common side effects and plan for close f/u. Encouraged call to clinic if symptoms worsening or adverse reactions.   - venlafaxine (EFFEXOR XR) 150 MG 24 hr capsule; Take 1 capsule (150 mg) by mouth daily  - CBC with platelets and differential; Future  - Vitamin B12; Future  - Vitamin D Deficiency; Future  - TSH with free T4 reflex; Future  - CBC with platelets and differential  - Vitamin B12  - Vitamin D Deficiency  - TSH with free T4 reflex    H/O gastric bypass  Encouraged her to start taking vitamins more regularly as nutritional deficiencies could exacerbate above.  - CBC with platelets and differential; Future  - Vitamin B12; Future  - Vitamin D Deficiency; Future  - CBC with platelets and differential  - Vitamin B12  - Vitamin D Deficiency    High priority for 2019-nCoV vaccine  COVID vaccine updated today.       BMI:   Estimated body mass index is 37.93 kg/m  as calculated from the following:    Height as of this encounter: 1.525 m (5' 0.05\").    Weight as of this encounter: 88.3 kg (194 lb 9 oz).   Weight management plan: She has had a gastric bypass and is hoping to restart exercise, just signed up for gym membership    Depression Screening Follow Up        12/28/2023     1:23 PM   PHQ   PHQ-9 " Total Score 14   Q9: Thoughts of better off dead/self-harm past 2 weeks Not at all             Patient Instructions   Next visit in 6-8 weeks  Ok to message me in 3 weeks if wanting to further increase the venlafaxine    Michelle Mondragon MD  [facility] LO[facility] Yan is a 63 year old, presenting for the following health issues:  Depression      12/28/2023     1:27 PM   Additional Questions   Roomed by Maris VIDAL   Accompanied by Self         12/28/2023     1:27 PM   Patient Reported Additional Medications   Patient reports taking the following new medications None       History of Present Illness       Mental Health Follow-up:  Patient presents to follow-up on Depression & Anxiety.Patient's depression since last visit has been:  Worse  The patient is having other symptoms associated with depression.  Patient's anxiety since last visit has been:  Bad  The patient is having other symptoms associated with anxiety.  Any significant life events: job concerns  Patient is feeling anxious or having panic attacks.  Patient has no concerns about alcohol or drug use.    She eats 2-3 servings of fruits and vegetables daily.She consumes 0 sweetened beverage(s) daily.She exercises with enough effort to increase her heart rate 9 or less minutes per day.  She exercises with enough effort to increase her heart rate 3 or less days per week.   She is taking medications regularly.         Depression Followup  How are you doing with your depression since your last visit? Improved venlafaxine helped with hot flashes   Are you having other symptoms that might be associated with depression? Yes:     Have you had a significant life event?  Job Concerns and Grief or Loss   Are you feeling anxious or having panic attacks?   No  Do you have any concerns with your use of alcohol or other drugs? No    Social History     Tobacco Use    Smoking status: Never     Passive exposure: Never    Smokeless tobacco:  "Never         12/28/2023     1:23 PM   PHQ   PHQ-9 Total Score 14   Q9: Thoughts of better off dead/self-harm past 2 weeks Not at all         12/28/2023     1:29 PM   BART-7 SCORE   Total Score 18 (severe anxiety)   Total Score 18     Farrah is a new patient to me.    She moved here from [place]. Lives in [place] but took the first available appointment she could get in to address her medications    However, was able to get refill from  In [place]    Depression/anxiety flared recently.   Citalopram 40 mg for a long time but was having hot flashes  Was changed to venlafaxine and doing okay for a while on it.   More recently is noting  lack of pleasure in things, feeling more anxious (has new job), apathetic. Hard to focus.   Harder to sleep at night  Feeling more racing heart from anxiety  Similar sx's in the past with flares in deprssion/anxiety  Hard to stop crying   Self increased venlafaxine to 75 mg bid about a week ago. Slight better focus but no other change yet.   Previously on paxil/prozac in distant past.   No history of hospitalization or suicidal ideation.  Depression for 30 + years. Depression in daughter.   With move no longer in exercise classes and gained back some weight- about 12#.   Had lost 70# after her gastric bypass.  Has not been taking her bariatric vitamins recently very faithfully due to a month of off-and-on colds.    Did make an appt with therapist          Objective    /82 (BP Location: Right arm, Patient Position: Sitting, Cuff Size: Adult Large)   Pulse 83   Temp 98.3  F (36.8  C) (Oral)   Resp 16   Ht 1.525 m (5' 0.05\")   Wt 88.3 kg (194 lb 9 oz)   SpO2 100%   BMI 37.93 kg/m    Body mass index is 37.93 kg/m .  Physical Exam   GENERAL: healthy, alert and no distress  NECK: no adenopathy, no asymmetry, masses, or scars and thyroid normal to palpation  RESP: lungs clear to auscultation - no rales, rhonchi or wheezes  CV: regular rate and rhythm, normal S1 S2, no S3 or " S4, no murmur, click or rub, no peripheral edema and peripheral pulses strong  MS: no gross musculoskeletal defects noted, no edema  PSYCH: mentation appears normal, affect normal/bright        12/28/2023     1:23 PM   PHQ-9 SCORE   PHQ-9 Total Score MyChart 14 (Moderate depression)   PHQ-9 Total Score 14         12/28/2023     1:29 PM   BART-7 SCORE   Total Score 18 (severe anxiety)   Total Score 18

## 2024-02-13 ENCOUNTER — VIRTUAL VISIT (OUTPATIENT)
Dept: FAMILY MEDICINE | Facility: CLINIC | Age: 64
End: 2024-02-13
Payer: COMMERCIAL

## 2024-02-13 DIAGNOSIS — R25.2 LEG CRAMP: ICD-10-CM

## 2024-02-13 DIAGNOSIS — M19.90 ARTHRITIS: ICD-10-CM

## 2024-02-13 DIAGNOSIS — G47.00 INSOMNIA, UNSPECIFIED TYPE: ICD-10-CM

## 2024-02-13 DIAGNOSIS — F41.9 ANXIETY: ICD-10-CM

## 2024-02-13 DIAGNOSIS — Z98.84 H/O GASTRIC BYPASS: ICD-10-CM

## 2024-02-13 DIAGNOSIS — E66.01 MORBID OBESITY (H): ICD-10-CM

## 2024-02-13 DIAGNOSIS — F33.1 MODERATE EPISODE OF RECURRENT MAJOR DEPRESSIVE DISORDER (H): Primary | ICD-10-CM

## 2024-02-13 PROCEDURE — 99214 OFFICE O/P EST MOD 30 MIN: CPT | Mod: 95 | Performed by: FAMILY MEDICINE

## 2024-02-13 RX ORDER — TRAZODONE HYDROCHLORIDE 100 MG/1
100 TABLET ORAL AT BEDTIME
Qty: 90 TABLET | Refills: 1 | Status: SHIPPED | OUTPATIENT
Start: 2024-02-13 | End: 2024-07-30

## 2024-02-13 RX ORDER — VENLAFAXINE HYDROCHLORIDE 225 MG/1
225 TABLET, EXTENDED RELEASE ORAL DAILY
Qty: 30 TABLET | Refills: 1 | Status: SHIPPED | OUTPATIENT
Start: 2024-02-13 | End: 2024-03-20

## 2024-02-13 RX ORDER — MELOXICAM 15 MG/1
15 TABLET ORAL DAILY PRN
Qty: 30 TABLET | Refills: 2 | Status: SHIPPED | OUTPATIENT
Start: 2024-02-13 | End: 2024-07-15

## 2024-02-13 RX ORDER — CYCLOBENZAPRINE HCL 5 MG
5 TABLET ORAL AT BEDTIME
Qty: 30 TABLET | Refills: 5 | Status: SHIPPED | OUTPATIENT
Start: 2024-02-13 | End: 2024-09-23

## 2024-02-13 ASSESSMENT — PATIENT HEALTH QUESTIONNAIRE - PHQ9
10. IF YOU CHECKED OFF ANY PROBLEMS, HOW DIFFICULT HAVE THESE PROBLEMS MADE IT FOR YOU TO DO YOUR WORK, TAKE CARE OF THINGS AT HOME, OR GET ALONG WITH OTHER PEOPLE: SOMEWHAT DIFFICULT
SUM OF ALL RESPONSES TO PHQ QUESTIONS 1-9: 15
SUM OF ALL RESPONSES TO PHQ QUESTIONS 1-9: 15

## 2024-02-13 NOTE — PROGRESS NOTES
"Instructions Relayed to Patient by Virtual Roomer:     If patient is not active on Backlift:  Relayed the following to patient: \"Would you like us to text or email you a link to join your appointment now or when your provider is ready to initiate the virtual visit?\"    Reminded patient to ensure they were logged on to virtual visit by arrival time listed. Documented in appointment notes if patient had flexibility to initiate visit sooner than arrival time. If pediatric virtual visit, ensured pediatric patient along with parent/guardian will be present for video visit.     Patient offered the website [email or web address] and/or phone number to Backlift Help line: [phone]    Farrah is a 63 year old who is being evaluated via a billable video visit.      How would you like to obtain your AVS? Backlift  If the video visit is dropped, the invitation should be resent by: Text to cell phone: [phone]  Will anyone else be joining your video visit? No      Assessment & Plan     Moderate episode of recurrent major depressive disorder (H)  Anxiety  Not yet controlled but modestly improved since increasing venlafaxine and tolerating change without significant AE. Interested in further  dose titration and will push up dose to 225 mg daily. Currently with no SI and following closely with her therapist also.   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 Ok to continue trazodone as needed. Reviewed symptoms of serotonin syndrome to be watchful for.   - traZODone (DESYREL) 100 MG tablet; Take 1 tablet (100 mg) by mouth at bedtime    Morbid obesity (H)  - Adult Comprehensive Weight Management  Referral; Future    Leg cramp  Ok to continue cyclobenzbrine as needed but urged avoidance of daily use given other serotonergic meds  - cyclobenzaprine (FLEXERIL) 5 MG " tablet; Take 1 tablet (5 mg) by mouth at bedtime    H/O gastric bypass  Will get her set up with our bariatric clinic to continue her wt mgmt and post surgical fu  - Adult Comprehensive Weight Management  Referral; Future    Arthritis  Discussed would minimize nsaids given her gastric surgery. Reviewed use of acetaminophen as preferred first line along with exercise.   - meloxicam (MOBIC) 15 MG tablet; Take 1 tablet (15 mg) by mouth daily as needed    Follow up 4 weeks for med check and cpe when able to be scheduled, likely about 6 mo out                  Subjective   Farrah is a 63 year old, presenting for the following health issues:  Recheck Medication,  Follow Up, and Weight Loss  - Had a gastric sleeve at the beginning of last year, was doing well with weight loss but starting to gain back weight due to new job and would like to loose that again.      2/13/2024    10:16 AM   Additional Questions   Roomed by Shaquille BUCKNER   Accompanied by Self     HPI     Medication Followup of Venlafaxine  Taking Medication as prescribed: yes  Side Effects:  A little bit of hot flash coming back  Medication Helping Symptoms:  yes- It is better, would like to increase dose and see how that goes    Depression and Anxiety Follow-Up  How are you doing with your depression since your last visit? Improved   How are you doing with your anxiety since your last visit?  Improved   Are you having other symptoms that might be associated with depression or anxiety? No  Have you had a significant life event? No   Do you have any concerns with your use of alcohol or other drugs? No    Social History     Tobacco Use    Smoking status: Never     Passive exposure: Never    Smokeless tobacco: Never   Substance Use Topics    Alcohol use: Not Currently    Drug use: Not Currently         12/28/2023     1:23 PM 2/13/2024    10:25 AM   PHQ   PHQ-9 Total Score 14 15   Q9: Thoughts of better off dead/self-harm past 2 weeks Not at all Not at all  "        12/28/2023     1:29 PM   BART-7 SCORE   Total Score 18 (severe anxiety)   Total Score 18         2/13/2024    10:25 AM   Last PHQ-9   1.  Little interest or pleasure in doing things 2   2.  Feeling down, depressed, or hopeless 2   3.  Trouble falling or staying asleep, or sleeping too much 2   4.  Feeling tired or having little energy 2   5.  Poor appetite or overeating 2   6.  Feeling bad about yourself 2   7.  Trouble concentrating 2   8.  Moving slowly or restless 1   Q9: Thoughts of better off dead/self-harm past 2 weeks 0   PHQ-9 Total Score 15         12/28/2023     1:23 PM 2/13/2024    10:25 AM   PHQ-9 SCORE   PHQ-9 Total Score MyChart 14 (Moderate depression) 15 (Moderately severe depression)   PHQ-9 Total Score 14 15         12/28/2023     1:29 PM   BART-7 SCORE   Total Score 18 (severe anxiety)   Total Score 18       Increased venlafaxine last visit from 75 mg to 150 mg  Improved. No longer feeling teary at work. Feel she can manage her emotions better at work. Few episodes of feeling more depressed this last week. Decreased motivation and \"hating myself\" in the week prior to last. That has let up a bit.   Still ongoing worry.   Focus is better but not fully treated.   Still feel irritable.   Only side effect on higher venlafaxine was little more hot flash returned (like she had on celexa but much more mild). Take every day but inconsistent with timing at times.   Anxiety is a little better.   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 Hard to go to bed at times. But once take trazodone can usually sleep.   Uri this last week.   Got in with counselor and will be seeing her every 1-2 weeks. Appt with her later today also.    Uses flexeril fairly routinely at hs  Meloxicam for arthritis. Was never told to not take nsaids after gastric sleeve  Stiffness in joints and finds meloxicam helpful for this, but not excessive pain    Last visit with bariatric clinic before she moved, potentially May 2023. "   Original wt 246 prior to surgery. Dropped to 176 lb but gained weight with on-line training and sitting more. Today was 188 lb.               Objective           Vitals:  No vitals were obtained today due to virtual visit.    Physical Exam   GENERAL: alert and no distress  EYES: Eyes grossly normal to inspection.  No discharge or erythema, or obvious scleral/conjunctival abnormalities.  RESP: No audible wheeze, cough, or visible cyanosis.    SKIN: Visible skin clear. No significant rash, abnormal pigmentation or lesions.  NEURO: Cranial nerves grossly intact.  Mentation and speech appropriate for age.  PSYCH: Appropriate affect, tone, and pace of words                Video-Visit Details    Type of service:  Video Visit     Originating Location (pt. Location): Home    Distant Location (provider location):  Off-site  Platform used for Video Visit: hCristopher  Signed Electronically by: Michelle Mondragon MD

## 2024-03-10 ENCOUNTER — HEALTH MAINTENANCE LETTER (OUTPATIENT)
Age: 64
End: 2024-03-10

## 2024-03-20 ENCOUNTER — VIRTUAL VISIT (OUTPATIENT)
Dept: FAMILY MEDICINE | Facility: CLINIC | Age: 64
End: 2024-03-20
Payer: COMMERCIAL

## 2024-03-20 DIAGNOSIS — F41.9 ANXIETY: ICD-10-CM

## 2024-03-20 DIAGNOSIS — G47.00 INSOMNIA, UNSPECIFIED TYPE: ICD-10-CM

## 2024-03-20 DIAGNOSIS — F33.1 MODERATE EPISODE OF RECURRENT MAJOR DEPRESSIVE DISORDER (H): Primary | ICD-10-CM

## 2024-03-20 DIAGNOSIS — Z12.11 SCREEN FOR COLON CANCER: ICD-10-CM

## 2024-03-20 PROCEDURE — 99214 OFFICE O/P EST MOD 30 MIN: CPT | Mod: 95 | Performed by: FAMILY MEDICINE

## 2024-03-20 RX ORDER — VENLAFAXINE HYDROCHLORIDE 225 MG/1
225 TABLET, EXTENDED RELEASE ORAL DAILY
Qty: 90 TABLET | Refills: 1 | Status: SHIPPED | OUTPATIENT
Start: 2024-03-20 | End: 2024-07-30

## 2024-03-20 ASSESSMENT — ANXIETY QUESTIONNAIRES
5. BEING SO RESTLESS THAT IT IS HARD TO SIT STILL: SEVERAL DAYS
1. FEELING NERVOUS, ANXIOUS, OR ON EDGE: SEVERAL DAYS
GAD7 TOTAL SCORE: 6
7. FEELING AFRAID AS IF SOMETHING AWFUL MIGHT HAPPEN: NOT AT ALL
3. WORRYING TOO MUCH ABOUT DIFFERENT THINGS: SEVERAL DAYS
7. FEELING AFRAID AS IF SOMETHING AWFUL MIGHT HAPPEN: NOT AT ALL
6. BECOMING EASILY ANNOYED OR IRRITABLE: MORE THAN HALF THE DAYS
GAD7 TOTAL SCORE: 6
GAD7 TOTAL SCORE: 6
4. TROUBLE RELAXING: NOT AT ALL
2. NOT BEING ABLE TO STOP OR CONTROL WORRYING: SEVERAL DAYS

## 2024-03-20 NOTE — PROGRESS NOTES
"Farrah is a 63 year old who is being evaluated via a billable video visit.        Instructions Relayed to Patient by Virtual Roomer:     Patient is active on Pipedrive:   Relayed following to patient: \"It looks like you are active on Pipedrive, are you able to join the visit this way? If not, do you need us to send you a link now or would you like your provider to send a link via text or email when they are ready to initiate the visit?\"    Reminded patient to ensure they were logged on to virtual visit by arrival time listed. Documented in appointment notes if patient had flexibility to initiate visit sooner than arrival time. If pediatric virtual visit, ensured pediatric patient along with parent/guardian will be present for video visit.     Patient offered the website [email or web address] and/or phone number to Pipedrive Help line: [phone] How would you like to obtain your AVS? [employer]  If the video visit is dropped, the invitation should be resent by: Send to e-mail at: [email or web address]  Will anyone else be joining your video visit? No      Assessment & Plan     Moderate episode of recurrent major depressive disorder (H)  Anxiety  Much improved with higher dosing of venlafaxine. Happy with current regimin and not wanting further changes at this time. Will continue CBT with there therapist. Follow up in July already planned for cpe  - venlafaxine (EFFEXOR-ER) 225 MG 24 hr tablet; Take 1 tablet (225 mg) by mouth daily    Insomnia, unspecified type  Somewhat improved. Continue Trazodone prn.     Screen for colon cancer  Reviewed colon ca screening options. She is ready to begin with a colonoscopy  - Colonoscopy Screening  Referral; Future                  Subjective   Farrah is a 63 year old, presenting for the following health issues:  Recheck Medication and Depression        3/20/2024     4:42 PM   Additional Questions   Roomed by Louie CHÁVEZ     History of Present Illness       Mental " "Health Follow-up:  Patient presents to follow-up on Depression & Anxiety.Patient's depression since last visit has been:  Better  The patient is not having other symptoms associated with depression.  Patient's anxiety since last visit has been:  Better  The patient is not having other symptoms associated with anxiety.  Any significant life events: job concerns  Patient is feeling anxious or having panic attacks.  Patient has no concerns about alcohol or drug use.    She eats 2-3 servings of fruits and vegetables daily.She consumes 0 sweetened beverage(s) daily.She exercises with enough effort to increase her heart rate 30 to 60 minutes per day.  She exercises with enough effort to increase her heart rate 3 or less days per week.   She is taking medications regularly.     Increased effexor to 225 mg daily  Overall better  No longer crying at work  Better focus  Less anxious  Sleep has been better. Trying ot keep regular schedule on her days off. Take 1/2 to 1 tablet now (instead of full tablet consistently before)  Forgot to take med yesterday and was super irritable.   Motivation is better  Much less feeling of \"hating myself\", diminished a lot  Starting to note more interest in things    Has been seeing therapist now.   Was seeing her weekly and now down to every 2 weeks. CBT    Not using melicam every day any longer either. Trying to use more prn now.  Scheduled with bariatric clinic this summer    Thinks cologuard was done in the last 2 years.               12/28/2023     1:23 PM 2/13/2024    10:25 AM   PHQ-9 SCORE   PHQ-9 Total Score MyChart 14 (Moderate depression) 15 (Moderately severe depression)   PHQ-9 Total Score 14 15         12/28/2023     1:29 PM 3/20/2024     4:41 PM   BART-7 SCORE   Total Score 18 (severe anxiety) 6 (mild anxiety)   Total Score 18 6             Objective           Vitals:  No vitals were obtained today due to virtual visit.    Physical Exam   GENERAL: alert and no distress  EYES: Eyes " grossly normal to inspection.  No discharge or erythema, or obvious scleral/conjunctival abnormalities.  RESP: No audible wheeze, cough, or visible cyanosis.    SKIN: Visible skin clear. No significant rash, abnormal pigmentation or lesions.  NEURO: Cranial nerves grossly intact.  Mentation and speech appropriate for age.  PSYCH: Appropriate affect, tone, and pace of words          Video-Visit Details    Type of service:  Video Visit   Originating Location (pt. Location): Home    Distant Location (provider location):  Off-site  Platform used for Video Visit: Christopher  Signed Electronically by: Michelle Mondragon MD

## 2024-07-15 DIAGNOSIS — M19.90 ARTHRITIS: ICD-10-CM

## 2024-07-15 RX ORDER — MELOXICAM 15 MG/1
15 TABLET ORAL DAILY PRN
Qty: 30 TABLET | Refills: 0 | Status: SHIPPED | OUTPATIENT
Start: 2024-07-15 | End: 2024-07-30

## 2024-07-22 ENCOUNTER — PATIENT OUTREACH (OUTPATIENT)
Dept: CARE COORDINATION | Facility: CLINIC | Age: 64
End: 2024-07-22
Payer: COMMERCIAL

## 2024-07-25 PROBLEM — M19.90 ARTHRITIS: Status: ACTIVE | Noted: 2024-07-25

## 2024-07-30 ENCOUNTER — OFFICE VISIT (OUTPATIENT)
Dept: FAMILY MEDICINE | Facility: CLINIC | Age: 64
End: 2024-07-30
Payer: COMMERCIAL

## 2024-07-30 VITALS
HEIGHT: 61 IN | SYSTOLIC BLOOD PRESSURE: 116 MMHG | WEIGHT: 194.4 LBS | BODY MASS INDEX: 36.7 KG/M2 | OXYGEN SATURATION: 98 % | RESPIRATION RATE: 16 BRPM | TEMPERATURE: 98.3 F | DIASTOLIC BLOOD PRESSURE: 68 MMHG | HEART RATE: 78 BPM

## 2024-07-30 DIAGNOSIS — Z00.00 ROUTINE HISTORY AND PHYSICAL EXAMINATION OF ADULT: Primary | ICD-10-CM

## 2024-07-30 DIAGNOSIS — F41.9 ANXIETY: ICD-10-CM

## 2024-07-30 DIAGNOSIS — Z11.4 SCREENING FOR HIV (HUMAN IMMUNODEFICIENCY VIRUS): ICD-10-CM

## 2024-07-30 DIAGNOSIS — E66.812 CLASS 2 SEVERE OBESITY DUE TO EXCESS CALORIES WITH SERIOUS COMORBIDITY AND BODY MASS INDEX (BMI) OF 37.0 TO 37.9 IN ADULT (H): ICD-10-CM

## 2024-07-30 DIAGNOSIS — Z11.59 NEED FOR HEPATITIS C SCREENING TEST: ICD-10-CM

## 2024-07-30 DIAGNOSIS — Z13.228 SCREENING FOR METABOLIC DISORDER: ICD-10-CM

## 2024-07-30 DIAGNOSIS — M19.90 ARTHRITIS: ICD-10-CM

## 2024-07-30 DIAGNOSIS — E66.01 CLASS 2 SEVERE OBESITY DUE TO EXCESS CALORIES WITH SERIOUS COMORBIDITY AND BODY MASS INDEX (BMI) OF 37.0 TO 37.9 IN ADULT (H): ICD-10-CM

## 2024-07-30 DIAGNOSIS — Z12.4 CERVICAL CANCER SCREENING: ICD-10-CM

## 2024-07-30 DIAGNOSIS — Z13.220 SCREENING FOR HYPERLIPIDEMIA: ICD-10-CM

## 2024-07-30 DIAGNOSIS — G47.00 INSOMNIA, UNSPECIFIED TYPE: ICD-10-CM

## 2024-07-30 DIAGNOSIS — F33.1 MODERATE EPISODE OF RECURRENT MAJOR DEPRESSIVE DISORDER (H): ICD-10-CM

## 2024-07-30 DIAGNOSIS — Z13.1 SCREENING FOR DIABETES MELLITUS: ICD-10-CM

## 2024-07-30 LAB
ALBUMIN SERPL BCG-MCNC: 4.5 G/DL (ref 3.5–5.2)
ALP SERPL-CCNC: 105 U/L (ref 40–150)
ALT SERPL W P-5'-P-CCNC: 28 U/L (ref 0–50)
ANION GAP SERPL CALCULATED.3IONS-SCNC: 7 MMOL/L (ref 7–15)
AST SERPL W P-5'-P-CCNC: 36 U/L (ref 0–45)
BILIRUB SERPL-MCNC: 0.5 MG/DL
BUN SERPL-MCNC: 18.4 MG/DL (ref 8–23)
CALCIUM SERPL-MCNC: 9.8 MG/DL (ref 8.8–10.4)
CHLORIDE SERPL-SCNC: 104 MMOL/L (ref 98–107)
CHOLEST SERPL-MCNC: 288 MG/DL
CREAT SERPL-MCNC: 0.79 MG/DL (ref 0.51–0.95)
EGFRCR SERPLBLD CKD-EPI 2021: 85 ML/MIN/1.73M2
FASTING STATUS PATIENT QL REPORTED: ABNORMAL
FASTING STATUS PATIENT QL REPORTED: NORMAL
GLUCOSE SERPL-MCNC: 88 MG/DL (ref 70–99)
HBA1C MFR BLD: 5.7 % (ref 0–5.6)
HCO3 SERPL-SCNC: 29 MMOL/L (ref 22–29)
HCV AB SERPL QL IA: NONREACTIVE
HDLC SERPL-MCNC: 72 MG/DL
HIV 1+2 AB+HIV1 P24 AG SERPL QL IA: NONREACTIVE
LDLC SERPL CALC-MCNC: 200 MG/DL
NONHDLC SERPL-MCNC: 216 MG/DL
POTASSIUM SERPL-SCNC: 4.1 MMOL/L (ref 3.4–5.3)
PROT SERPL-MCNC: 7.2 G/DL (ref 6.4–8.3)
SODIUM SERPL-SCNC: 140 MMOL/L (ref 135–145)
TRIGL SERPL-MCNC: 79 MG/DL

## 2024-07-30 PROCEDURE — 80061 LIPID PANEL: CPT

## 2024-07-30 PROCEDURE — 87624 HPV HI-RISK TYP POOLED RSLT: CPT

## 2024-07-30 PROCEDURE — 83036 HEMOGLOBIN GLYCOSYLATED A1C: CPT

## 2024-07-30 PROCEDURE — 80053 COMPREHEN METABOLIC PANEL: CPT

## 2024-07-30 PROCEDURE — 36415 COLL VENOUS BLD VENIPUNCTURE: CPT

## 2024-07-30 PROCEDURE — 87389 HIV-1 AG W/HIV-1&-2 AB AG IA: CPT

## 2024-07-30 PROCEDURE — 86803 HEPATITIS C AB TEST: CPT

## 2024-07-30 PROCEDURE — 99396 PREV VISIT EST AGE 40-64: CPT

## 2024-07-30 PROCEDURE — 99214 OFFICE O/P EST MOD 30 MIN: CPT | Mod: 25

## 2024-07-30 PROCEDURE — G0145 SCR C/V CYTO,THINLAYER,RESCR: HCPCS

## 2024-07-30 RX ORDER — MELOXICAM 15 MG/1
15 TABLET ORAL DAILY PRN
Qty: 30 TABLET | Refills: 0 | Status: SHIPPED | OUTPATIENT
Start: 2024-07-30

## 2024-07-30 RX ORDER — VENLAFAXINE HYDROCHLORIDE 225 MG/1
225 TABLET, EXTENDED RELEASE ORAL DAILY
Qty: 90 TABLET | Refills: 3 | Status: SHIPPED | OUTPATIENT
Start: 2024-07-30

## 2024-07-30 RX ORDER — TRAZODONE HYDROCHLORIDE 100 MG/1
100 TABLET ORAL AT BEDTIME
Qty: 90 TABLET | Refills: 3 | Status: SHIPPED | OUTPATIENT
Start: 2024-07-30

## 2024-07-30 SDOH — HEALTH STABILITY: PHYSICAL HEALTH: ON AVERAGE, HOW MANY MINUTES DO YOU ENGAGE IN EXERCISE AT THIS LEVEL?: 20 MIN

## 2024-07-30 SDOH — HEALTH STABILITY: PHYSICAL HEALTH: ON AVERAGE, HOW MANY DAYS PER WEEK DO YOU ENGAGE IN MODERATE TO STRENUOUS EXERCISE (LIKE A BRISK WALK)?: 3 DAYS

## 2024-07-30 ASSESSMENT — ANXIETY QUESTIONNAIRES
7. FEELING AFRAID AS IF SOMETHING AWFUL MIGHT HAPPEN: NOT AT ALL
GAD7 TOTAL SCORE: 0
GAD7 TOTAL SCORE: 0
4. TROUBLE RELAXING: NOT AT ALL
3. WORRYING TOO MUCH ABOUT DIFFERENT THINGS: NOT AT ALL
2. NOT BEING ABLE TO STOP OR CONTROL WORRYING: NOT AT ALL
5. BEING SO RESTLESS THAT IT IS HARD TO SIT STILL: NOT AT ALL
GAD7 TOTAL SCORE: 0
6. BECOMING EASILY ANNOYED OR IRRITABLE: NOT AT ALL
IF YOU CHECKED OFF ANY PROBLEMS ON THIS QUESTIONNAIRE, HOW DIFFICULT HAVE THESE PROBLEMS MADE IT FOR YOU TO DO YOUR WORK, TAKE CARE OF THINGS AT HOME, OR GET ALONG WITH OTHER PEOPLE: NOT DIFFICULT AT ALL
7. FEELING AFRAID AS IF SOMETHING AWFUL MIGHT HAPPEN: NOT AT ALL
1. FEELING NERVOUS, ANXIOUS, OR ON EDGE: NOT AT ALL
8. IF YOU CHECKED OFF ANY PROBLEMS, HOW DIFFICULT HAVE THESE MADE IT FOR YOU TO DO YOUR WORK, TAKE CARE OF THINGS AT HOME, OR GET ALONG WITH OTHER PEOPLE?: NOT DIFFICULT AT ALL

## 2024-07-30 ASSESSMENT — SOCIAL DETERMINANTS OF HEALTH (SDOH): HOW OFTEN DO YOU GET TOGETHER WITH FRIENDS OR RELATIVES?: ONCE A WEEK

## 2024-07-30 ASSESSMENT — PATIENT HEALTH QUESTIONNAIRE - PHQ9
SUM OF ALL RESPONSES TO PHQ QUESTIONS 1-9: 1
SUM OF ALL RESPONSES TO PHQ QUESTIONS 1-9: 1
10. IF YOU CHECKED OFF ANY PROBLEMS, HOW DIFFICULT HAVE THESE PROBLEMS MADE IT FOR YOU TO DO YOUR WORK, TAKE CARE OF THINGS AT HOME, OR GET ALONG WITH OTHER PEOPLE: NOT DIFFICULT AT ALL

## 2024-07-30 ASSESSMENT — PAIN SCALES - GENERAL: PAINLEVEL: NO PAIN (0)

## 2024-07-30 NOTE — PROGRESS NOTES
Preventive Care Visit  [facility]  Jul 30, 2024      Assessment & Plan     Routine history and physical examination of adult  Screening labs today per maintenance, age, risk assessment, and to promote patient wellbeing.  Colonoscopy referral ordered 3/20/2024, patient will schedule procedure.  Patient believes she is up-to-date on vaccinations, ANNMARIE signed to access medical records from [place].  Mammogram completed 2023, due 2025.  12/2023 labs within normal limits: CBC, TSH, vitamin D, vitamin B12.  Blood pressure in clinic 116/68.  - PRIMARY CARE FOLLOW-UP SCHEDULING; Future    Cervical cancer screening  Believes last Pap exam was approximately 3 years ago, results were inconclusive.  It was recommended to repeat Pap exam before 5 years.  Postmenopausal for 7 years.    Screening for diabetes mellitus  No prior glucose or A1c.  Managed with lifestyle modifications.  - Hemoglobin A1c; Future    Screening for hyperlipidemia  No prior lipid panel.  Managed with lifestyle modifications.  - Lipid panel reflex to direct LDL Fasting; Future    Screening for metabolic disorder  No prior comprehensive metabolic panel.  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  - Hepatitis C Screen Reflex to HCV RNA Quant and Genotype; Future    Moderate episode of recurrent major depressive disorder (H)  Anxiety  PHQ-2 score 1 and BART score 0.  Anxiety and depression managed with venlafaxine 225 mg daily.  Reports depression and anxiety as stable with medication.  Would like to continue with current dosage.  Needs refill.    - venlafaxine (EFFEXOR-ER) 225 MG 24 hr tablet; Take 1 tablet (225 mg) by mouth daily    Insomnia, unspecified type  Managed with trazodone 100 mg at bedtime.  " Needs refill.  - traZODone (DESYREL) 100 MG tablet; Take 1 tablet (100 mg) by mouth at bedtime    Arthritis  Takes meloxicam sparingly for arthritic pain.  Tries to avoid NSAID use due to gastric sleeve surgery.  - meloxicam (MOBIC) 15 MG tablet; Take 1 tablet (15 mg) by mouth daily as needed for moderate pain    Class 2 severe obesity due to excess calories with serious comorbidity and body mass index (BMI) of 37.0 to 37.9 in adult (H)    BMI  Estimated body mass index is 37.18 kg/m  as calculated from the following:    Height as of this encounter: 1.54 m (5' 0.63\").    Weight as of this encounter: 88.2 kg (194 lb 6.4 oz).     Counseling  Appropriate preventive services were addressed with this patient via screening, questionnaire, or discussion as appropriate for fall prevention, nutrition, physical activity, Tobacco-use cessation, weight loss and cognition.  Checklist reviewing preventive services available has been given to the patient.  Reviewed patient's diet, addressing concerns and/or questions.   She is at risk for lack of exercise and has been provided with information to increase physical activity for the benefit of her well-being.   The patient was instructed to see the dentist every 6 months.   She is at risk for psychosocial distress and has been provided with information to reduce risk.     Karen Yan is a 63 year old, presenting for the following:  Physical and Establish Care        7/30/2024     9:23 AM   Additional Questions   Roomed by Jannet HUGGINS CMA      Health Care Directive  Patient does not have a Health Care Directive or Living Will    HPI    Patient is a 63-year-old female presenting for annual physical exam.  Moved from [place] last year to be closer to daughter and grandchildren.  Medical history includes depression, anxiety, arthritis, gastric sleeve surgery 2022.  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    LMP: post menopausal- 7 years   Hx of abnormal pap smear: " inconclusive pap smear   Last pap smear: few years ago  Sexually active: not currently   Contraception: post menopausal    Anxiety and depression managed with venlafaxine 225 mg daily for the past year.  Previously saw therapist through [facility]- virtual visits.  Reports depression and anxiety as stable with medication.  Was previously on citalopram but had side effects of diaphoresis.  Takes meloxicam sparingly for chronic back pain and other arthritic pain.  Insomnia managed with trazodone 100 mg at bedtime.  Takes cyclobenzaprine 5 mg occasionally for bilateral leg cramping at nighttime.        7/30/2024   General Health   How would you rate your overall physical health? (!) FAIR   Feel stress (tense, anxious, or unable to sleep) Only a little      (!) STRESS CONCERN      7/30/2024   Nutrition   Three or more servings of calcium each day? (!) NO   Diet: Regular (no restrictions)   How many servings of fruit and vegetables per day? (!) 2-3   How many sweetened beverages each day? 0-1          7/30/2024   Exercise   Days per week of moderate/strenous exercise 3 days   Average minutes spent exercising at this level 20 min          7/30/2024   Social Factors   Frequency of gathering with friends or relatives Once a week   Worry food won't last until get money to buy more No   Food not last or not have enough money for food? No   Do you have housing? (Housing is defined as stable permanent housing and does not include staying ouside in a car, in a tent, in an abandoned building, in an overnight shelter, or couch-surfing.) Yes   Are you worried about losing your housing? No   Lack of transportation? No   Unable to get utilities (heat,electricity)? No          7/30/2024   Fall Risk   Fallen 2 or more times in the past year? No   Trouble with walking or balance? No             7/30/2024   Dental   Dentist two times every year? (!) NO          7/30/2024   TB Screening   Were you born outside of the [place]? No      Today's PHQ-9 Score:       2024     9:18 AM   PHQ-9 SCORE   PHQ-9 Total Score MyChart 1 (Minimal depression)   PHQ-9 Total Score 1         2024   Substance Use   Alcohol more than 3/day or more than 7/wk No   Do you use any other substances recreationally? No      Social History     Tobacco Use    Smoking status: Never     Passive exposure: Never    Smokeless tobacco: Never   Vaping Use    Vaping status: Never Used   Substance Use Topics    Alcohol use: Not Currently    Drug use: Not Currently             2024   Breast Cancer Screening   Family history of breast, colon, or ovarian cancer? No / Unknown      Mammogram Screening - Mammogram every 1-2 years updated in Health Maintenance based on mutual decision making        2024   One time HIV Screening   Previous HIV test? No          2024   STI Screening   New sexual partner(s) since last STI/HIV test? No      History of abnormal Pap smear: No - age 30-64 HPV with reflex Pap every 5 years recommended    ASCVD Risk   The ASCVD Risk score (Kulwant TORRE, et al., 2019) failed to calculate for the following reasons:    Cannot find a previous HDL lab    Cannot find a previous total cholesterol lab    Fracture Risk Assessment Tool  Link to Frax Calculator  Use the information below to complete the Frax calculator  : 1960  Sex: female  Weight (kg): 88.2 kg (actual weight)  Height (cm): 154 cm  Previous Fragility Fracture:  No  History of parent with fractured hip:  No  Current Smoking:  No  Patient has been on glucocorticoids for more than 3 months (5mg/day or more): No  Rheumatoid Arthritis on Problem List:  No  Secondary Osteoporosis on Problem List:  No  Consumes 3 or more units of alcohol per day: No  Femoral Neck BMD (g/cm2)           Reviewed and updated as needed this visit by Provider    No past medical history on file.  Past Surgical History:   Procedure Laterality Date    C/SECTION, LOW TRANSVERSE      x2    GASTRIC BYPASS   "2022    sleeve    TOTAL KNEE ARTHROPLASTY Left     2021    WRIST SURGERY Left     ganglion cyst     BP Readings from Last 3 Encounters:   07/30/24 116/68   12/28/23 117/82    Wt Readings from Last 3 Encounters:   07/30/24 88.2 kg (194 lb 6.4 oz)   12/28/23 88.3 kg (194 lb 9 oz)         Current Outpatient Medications   Medication Sig Dispense Refill    CALCIUM CITRATE PO       cyclobenzaprine (FLEXERIL) 5 MG tablet Take 1 tablet (5 mg) by mouth at bedtime (Patient taking differently: Take 5 mg by mouth as needed) 30 tablet 5    meloxicam (MOBIC) 15 MG tablet Take 1 tablet (15 mg) by mouth daily as needed for moderate pain 30 tablet 0    Multiple Vitamin (MULTIVITAMIN PO)       Multiple Vitamins-Minerals (HAIR SKIN & NAILS PO)       traZODone (DESYREL) 100 MG tablet Take 1 tablet (100 mg) by mouth at bedtime 90 tablet 3    venlafaxine (EFFEXOR-ER) 225 MG 24 hr tablet Take 1 tablet (225 mg) by mouth daily 90 tablet 3    MAGNESIUM PO        Review of Systems  Review of systems negative otherwise known HPI.     Objective    Exam  /68 (BP Location: Right arm, Patient Position: Sitting, Cuff Size: Adult Regular)   Pulse 78   Temp 98.3  F (36.8  C) (Oral)   Resp 16   Ht 1.54 m (5' 0.63\")   Wt 88.2 kg (194 lb 6.4 oz)   SpO2 98%   BMI 37.18 kg/m     Estimated body mass index is 37.18 kg/m  as calculated from the following:    Height as of this encounter: 1.54 m (5' 0.63\").    Weight as of this encounter: 88.2 kg (194 lb 6.4 oz).    Physical Exam  General: Alert, oriented, no acute distress.     Eyes: Normal external eye, conjunctiva, and lids. JOSE.  Ears: External ear nontender. Normal landmark and color.   Oropharynx: Dentition intact. Oral and posterior pharynx pink and moist.      Neck: Supple, no masses or nodes. No adenopathy.     Respiratory: Lungs clear, unlabored. No rales, rhonchi, or wheezes.     Cardiovascular: Regular rate and rhythm. No murmurs. No peripheral edema.      Breasts: Normal without " suspicious masses, skin, nipple changes, or axillary nodes.  Gastrointestinal: Normoactive bowel sounds. Soft, nontender, no organomegaly.      Musculoskeletal: No joint tenderness, deformity, or swelling.      Skin: No suspicious lesions, rashes, or abnormalities.     Neurologic: No gross motor or sensory deficit. Mentation intact and speech normal.      Psychiatric: Appropriate affect.   Genitourinary: Vulva and vagina without erythema. Normal vaginal mucosa. Cervix no lesions or cervical motion tenderness.     Signed Electronically by: RAQUEL Godinez CNP    Answers submitted by the patient for this visit:  Patient Health Questionnaire (Submitted on 7/30/2024)  If you checked off any problems, how difficult have these problems made it for you to do your work, take care of things at home, or get along with other people?: Not difficult at all  PHQ9 TOTAL SCORE: 1  BART-7 (Submitted on 7/30/2024)  BART 7 TOTAL SCORE: 0

## 2024-07-31 DIAGNOSIS — E78.2 MIXED HYPERLIPIDEMIA: Primary | ICD-10-CM

## 2024-07-31 NOTE — RESULT ENCOUNTER NOTE
Comprehensive metabolic panel (electrolytes, kidney function, liver function) within normal range.  HIV and hepatitis C testing negative.     Hemoglobin A1c (screening for diabetes) is 5.7%, resulting in diagnosis of prediabetes. Important to prevent progression to type II diabetes (which is A1c 6.5%) with healthy dietary habits.     Most elements of lipid panel elevated. Should recheck lipid panel in 3 months- can be lab only visit call [phone] to schedule appointment. Make sure you are fasting (not eating) for 9-12 hours before lab draw.   To improve your cholesterol and reduce blood glucose:  Limit red meat, fatty dairy (whole milk, butter, and ice cream), and fried foods.  Limit sugary foods and beverages (juice, pop, and sports drinks).  Eat more fruits, vegetables, whole grain (brown rice, whole wheat bread, and oatmeal), fish, and nuts.  Engagement in physical activity.

## 2024-08-02 LAB
HPV HR 12 DNA CVX QL NAA+PROBE: NEGATIVE
HPV16 DNA CVX QL NAA+PROBE: NEGATIVE
HPV18 DNA CVX QL NAA+PROBE: NEGATIVE
HUMAN PAPILLOMA VIRUS FINAL DIAGNOSIS: NORMAL

## 2024-08-05 LAB
BKR LAB AP GYN ADEQUACY: NORMAL
BKR LAB AP GYN INTERPRETATION: NORMAL
BKR LAB AP PREVIOUS ABNL DX: NORMAL
BKR LAB AP PREVIOUS ABNORMAL: NORMAL
PATH REPORT.COMMENTS IMP SPEC: NORMAL
PATH REPORT.COMMENTS IMP SPEC: NORMAL
PATH REPORT.RELEVANT HX SPEC: NORMAL

## 2024-09-21 DIAGNOSIS — R25.2 LEG CRAMP: ICD-10-CM

## 2024-09-23 RX ORDER — CYCLOBENZAPRINE HCL 5 MG
5 TABLET ORAL AT BEDTIME
Qty: 30 TABLET | Refills: 0 | Status: SHIPPED | OUTPATIENT
Start: 2024-09-23

## 2024-10-18 ENCOUNTER — ANCILLARY PROCEDURE (OUTPATIENT)
Dept: GENERAL RADIOLOGY | Facility: CLINIC | Age: 64
End: 2024-10-18
Attending: FAMILY MEDICINE
Payer: COMMERCIAL

## 2024-10-18 ENCOUNTER — OFFICE VISIT (OUTPATIENT)
Dept: FAMILY MEDICINE | Facility: CLINIC | Age: 64
End: 2024-10-18
Payer: COMMERCIAL

## 2024-10-18 VITALS
TEMPERATURE: 97.8 F | RESPIRATION RATE: 18 BRPM | OXYGEN SATURATION: 97 % | SYSTOLIC BLOOD PRESSURE: 110 MMHG | BODY MASS INDEX: 38.04 KG/M2 | DIASTOLIC BLOOD PRESSURE: 74 MMHG | HEART RATE: 88 BPM | WEIGHT: 198.9 LBS

## 2024-10-18 DIAGNOSIS — R25.2 LEG CRAMP: ICD-10-CM

## 2024-10-18 DIAGNOSIS — M25.572 PAIN IN JOINT INVOLVING ANKLE AND FOOT, LEFT: ICD-10-CM

## 2024-10-18 DIAGNOSIS — M25.572 PAIN IN JOINT INVOLVING ANKLE AND FOOT, LEFT: Primary | ICD-10-CM

## 2024-10-18 PROCEDURE — 73610 X-RAY EXAM OF ANKLE: CPT | Mod: TC | Performed by: RADIOLOGY

## 2024-10-18 PROCEDURE — 99213 OFFICE O/P EST LOW 20 MIN: CPT | Performed by: FAMILY MEDICINE

## 2024-10-18 RX ORDER — CYCLOBENZAPRINE HCL 5 MG
5 TABLET ORAL AT BEDTIME
Qty: 30 TABLET | Refills: 0 | Status: SHIPPED | OUTPATIENT
Start: 2024-10-18
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
## 2024-10-18 NOTE — LETTER
10/18/2024    Farrah Garcia   1960        To Whom it May Concern;    Please excuse Farrah Garcia from work/school for a healthcare visit on Oct 18, 2024.    She will be on home rest for 72 hours due to injury    Sincerely,        Christopher Ibanez MD

## 2024-11-13 DIAGNOSIS — R25.2 LEG CRAMP: ICD-10-CM

## 2024-11-13 RX ORDER — CYCLOBENZAPRINE HCL 5 MG
5 TABLET ORAL AT BEDTIME
Qty: 30 TABLET | Refills: 0 | OUTPATIENT
Start: 2024-11-13

## 2024-11-14 RX ORDER — CYCLOBENZAPRINE HCL 5 MG
5 TABLET ORAL AT BEDTIME
Qty: 30 TABLET | Refills: 0 | Status: SHIPPED | OUTPATIENT
Start: 2024-11-14

## 2024-11-14 NOTE — TELEPHONE ENCOUNTER
Pt will be changing to Michelle Paniagua as PCP.  Informed pt that she will need to update this at her next appt and with her pharmacy so medication is routed correctly.  Pt understood.

## 2024-11-14 NOTE — TELEPHONE ENCOUNTER
Please clarify with patient where she will be receiving care, primary care going forward so the right provider will refill her medications.    If she will be continuing at [facility], with me, I would recommend med check in 3 to 4 months.  If she is going to be continuing care in [place] further refills should go to that clinic.  She had last physical with provider at [facility] July 2024.  Will fill x 1 today

## 2024-12-15 DIAGNOSIS — R25.2 LEG CRAMP: ICD-10-CM

## 2024-12-16 RX ORDER — CYCLOBENZAPRINE HCL 5 MG
5 TABLET ORAL AT BEDTIME
Qty: 30 TABLET | Refills: 0 | Status: SHIPPED | OUTPATIENT
Start: 2024-12-16

## 2025-01-12 DIAGNOSIS — R25.2 LEG CRAMP: ICD-10-CM

## 2025-01-13 RX ORDER — CYCLOBENZAPRINE HCL 5 MG
5 TABLET ORAL AT BEDTIME
Qty: 30 TABLET | Refills: 0 | Status: SHIPPED | OUTPATIENT
Start: 2025-01-13

## 2025-02-09 DIAGNOSIS — R25.2 LEG CRAMP: ICD-10-CM

## 2025-02-10 RX ORDER — CYCLOBENZAPRINE HCL 5 MG
5 TABLET ORAL AT BEDTIME
Qty: 30 TABLET | Refills: 0 | Status: SHIPPED | OUTPATIENT
Start: 2025-02-10

## 2025-03-13 DIAGNOSIS — R25.2 LEG CRAMP: ICD-10-CM

## 2025-03-13 RX ORDER — CYCLOBENZAPRINE HCL 5 MG
5 TABLET ORAL AT BEDTIME
Qty: 30 TABLET | Refills: 5 | Status: SHIPPED | OUTPATIENT
Start: 2025-03-13

## 2025-04-06 ENCOUNTER — HEALTH MAINTENANCE LETTER (OUTPATIENT)
Age: 65
End: 2025-04-06

## 2025-07-21 ENCOUNTER — PATIENT OUTREACH (OUTPATIENT)
Dept: CARE COORDINATION | Facility: CLINIC | Age: 65
End: 2025-07-21
Payer: COMMERCIAL

## 2025-08-06 ENCOUNTER — OFFICE VISIT (OUTPATIENT)
Dept: FAMILY MEDICINE | Facility: CLINIC | Age: 65
End: 2025-08-06
Payer: COMMERCIAL

## 2025-08-06 VITALS
HEIGHT: 61 IN | TEMPERATURE: 98.2 F | RESPIRATION RATE: 16 BRPM | BODY MASS INDEX: 38.33 KG/M2 | OXYGEN SATURATION: 97 % | WEIGHT: 203 LBS

## 2025-08-06 DIAGNOSIS — R42 DIZZINESS: Primary | ICD-10-CM

## 2025-08-06 DIAGNOSIS — R42 LIGHTHEADEDNESS: ICD-10-CM

## 2025-08-06 DIAGNOSIS — N89.8 VAGINAL DISCHARGE: ICD-10-CM

## 2025-08-06 LAB
ALBUMIN SERPL BCG-MCNC: 4.2 G/DL (ref 3.5–5.2)
ALP SERPL-CCNC: 126 U/L (ref 40–150)
ALT SERPL W P-5'-P-CCNC: 27 U/L (ref 0–50)
ANION GAP SERPL CALCULATED.3IONS-SCNC: 13 MMOL/L (ref 7–15)
AST SERPL W P-5'-P-CCNC: 31 U/L (ref 0–45)
BILIRUB SERPL-MCNC: 0.4 MG/DL
BUN SERPL-MCNC: 21 MG/DL (ref 8–23)
CALCIUM SERPL-MCNC: 9.9 MG/DL (ref 8.8–10.4)
CHLORIDE SERPL-SCNC: 102 MMOL/L (ref 98–107)
CLUE CELLS: ABNORMAL
CREAT SERPL-MCNC: 0.76 MG/DL (ref 0.51–0.95)
EGFRCR SERPLBLD CKD-EPI 2021: 88 ML/MIN/1.73M2
ERYTHROCYTE [DISTWIDTH] IN BLOOD BY AUTOMATED COUNT: 13 % (ref 10–15)
EST. AVERAGE GLUCOSE BLD GHB EST-MCNC: 114 MG/DL
GLUCOSE SERPL-MCNC: 142 MG/DL (ref 70–99)
HBA1C MFR BLD: 5.6 % (ref 0–5.6)
HCO3 SERPL-SCNC: 25 MMOL/L (ref 22–29)
HCT VFR BLD AUTO: 42.3 % (ref 35–47)
HGB BLD-MCNC: 14.2 G/DL (ref 11.7–15.7)
MCH RBC QN AUTO: 30.9 PG (ref 26.5–33)
MCHC RBC AUTO-ENTMCNC: 33.6 G/DL (ref 31.5–36.5)
MCV RBC AUTO: 92 FL (ref 78–100)
PLATELET # BLD AUTO: 245 10E3/UL (ref 150–450)
POTASSIUM SERPL-SCNC: 4 MMOL/L (ref 3.4–5.3)
PROT SERPL-MCNC: 6.9 G/DL (ref 6.4–8.3)
RBC # BLD AUTO: 4.6 10E6/UL (ref 3.8–5.2)
SODIUM SERPL-SCNC: 140 MMOL/L (ref 135–145)
TRICHOMONAS, WET PREP: ABNORMAL
TSH SERPL DL<=0.005 MIU/L-ACNC: 3.41 UIU/ML (ref 0.3–4.2)
WBC # BLD AUTO: 6 10E3/UL (ref 4–11)
WBC'S/HIGH POWER FIELD, WET PREP: ABNORMAL
YEAST, WET PREP: ABNORMAL

## 2025-08-06 PROCEDURE — 85027 COMPLETE CBC AUTOMATED: CPT | Performed by: FAMILY MEDICINE

## 2025-08-06 PROCEDURE — 83036 HEMOGLOBIN GLYCOSYLATED A1C: CPT | Performed by: FAMILY MEDICINE

## 2025-08-06 PROCEDURE — 84443 ASSAY THYROID STIM HORMONE: CPT | Performed by: FAMILY MEDICINE

## 2025-08-06 PROCEDURE — 99214 OFFICE O/P EST MOD 30 MIN: CPT | Performed by: FAMILY MEDICINE

## 2025-08-06 PROCEDURE — 80053 COMPREHEN METABOLIC PANEL: CPT | Performed by: FAMILY MEDICINE

## 2025-08-06 PROCEDURE — 36415 COLL VENOUS BLD VENIPUNCTURE: CPT | Performed by: FAMILY MEDICINE

## 2025-08-06 PROCEDURE — 87210 SMEAR WET MOUNT SALINE/INK: CPT | Performed by: FAMILY MEDICINE

## 2025-08-06 RX ORDER — MECLIZINE HYDROCHLORIDE 25 MG/1
25 TABLET ORAL
COMMUNITY
Start: 2025-06-13

## 2025-08-06 RX ORDER — METRONIDAZOLE 7.5 MG/G
1 GEL VAGINAL DAILY
Qty: 35 G | Refills: 0 | Status: SHIPPED | OUTPATIENT
Start: 2025-08-06 | End: 2025-08-13

## 2025-08-06 ASSESSMENT — PATIENT HEALTH QUESTIONNAIRE - PHQ9
SUM OF ALL RESPONSES TO PHQ QUESTIONS 1-9: 7
SUM OF ALL RESPONSES TO PHQ QUESTIONS 1-9: 7
10. IF YOU CHECKED OFF ANY PROBLEMS, HOW DIFFICULT HAVE THESE PROBLEMS MADE IT FOR YOU TO DO YOUR WORK, TAKE CARE OF THINGS AT HOME, OR GET ALONG WITH OTHER PEOPLE: SOMEWHAT DIFFICULT

## 2025-08-14 ENCOUNTER — THERAPY VISIT (OUTPATIENT)
Dept: PHYSICAL THERAPY | Facility: CLINIC | Age: 65
End: 2025-08-14
Attending: FAMILY MEDICINE
Payer: COMMERCIAL

## 2025-08-14 DIAGNOSIS — R42 DIZZINESS: Primary | ICD-10-CM

## 2025-08-14 PROCEDURE — 95992 CANALITH REPOSITIONING PROC: CPT | Mod: GP

## 2025-08-14 PROCEDURE — 97161 PT EVAL LOW COMPLEX 20 MIN: CPT | Mod: GP
